# Patient Record
Sex: FEMALE | Race: WHITE
[De-identification: names, ages, dates, MRNs, and addresses within clinical notes are randomized per-mention and may not be internally consistent; named-entity substitution may affect disease eponyms.]

---

## 2018-05-09 ENCOUNTER — HOSPITAL ENCOUNTER (EMERGENCY)
Dept: HOSPITAL 17 - NEPC | Age: 63
Discharge: HOME | End: 2018-05-09
Payer: COMMERCIAL

## 2018-05-09 VITALS
HEART RATE: 92 BPM | OXYGEN SATURATION: 100 % | RESPIRATION RATE: 16 BRPM | DIASTOLIC BLOOD PRESSURE: 69 MMHG | SYSTOLIC BLOOD PRESSURE: 130 MMHG

## 2018-05-09 VITALS — RESPIRATION RATE: 18 BRPM | OXYGEN SATURATION: 100 %

## 2018-05-09 VITALS
OXYGEN SATURATION: 100 % | DIASTOLIC BLOOD PRESSURE: 57 MMHG | TEMPERATURE: 97.4 F | RESPIRATION RATE: 20 BRPM | HEART RATE: 97 BPM | SYSTOLIC BLOOD PRESSURE: 109 MMHG

## 2018-05-09 DIAGNOSIS — I10: ICD-10-CM

## 2018-05-09 DIAGNOSIS — E11.9: ICD-10-CM

## 2018-05-09 DIAGNOSIS — R10.13: Primary | ICD-10-CM

## 2018-05-09 DIAGNOSIS — E78.00: ICD-10-CM

## 2018-05-09 DIAGNOSIS — Z79.84: ICD-10-CM

## 2018-05-09 DIAGNOSIS — E03.9: ICD-10-CM

## 2018-05-09 DIAGNOSIS — M32.9: ICD-10-CM

## 2018-05-09 LAB
ALBUMIN SERPL-MCNC: 2.7 GM/DL (ref 3.4–5)
ALP SERPL-CCNC: 56 U/L (ref 45–117)
ALT SERPL-CCNC: 24 U/L (ref 10–53)
AST SERPL-CCNC: 34 U/L (ref 15–37)
BASOPHILS # BLD AUTO: 0 TH/MM3 (ref 0–0.2)
BASOPHILS NFR BLD: 0.2 % (ref 0–2)
BILIRUB SERPL-MCNC: 0.2 MG/DL (ref 0.2–1)
BUN SERPL-MCNC: 13 MG/DL (ref 7–18)
CALCIUM SERPL-MCNC: 8.5 MG/DL (ref 8.5–10.1)
CHLORIDE SERPL-SCNC: 102 MEQ/L (ref 98–107)
COLOR UR: YELLOW
CREAT SERPL-MCNC: 0.49 MG/DL (ref 0.5–1)
EOSINOPHIL # BLD: 0 TH/MM3 (ref 0–0.4)
EOSINOPHIL NFR BLD: 0.5 % (ref 0–4)
ERYTHROCYTE [DISTWIDTH] IN BLOOD BY AUTOMATED COUNT: 17.4 % (ref 11.6–17.2)
GFR SERPLBLD BASED ON 1.73 SQ M-ARVRAT: 128 ML/MIN (ref 89–?)
GLUCOSE SERPL-MCNC: 114 MG/DL (ref 74–106)
GLUCOSE UR STRIP-MCNC: (no result) MG/DL
HCO3 BLD-SCNC: 26.5 MEQ/L (ref 21–32)
HCT VFR BLD CALC: 28.9 % (ref 35–46)
HGB BLD-MCNC: 9.5 GM/DL (ref 11.6–15.3)
HGB UR QL STRIP: (no result)
INR PPP: 1 RATIO
KETONES UR STRIP-MCNC: (no result) MG/DL
LYMPHOCYTES # BLD AUTO: 0.3 TH/MM3 (ref 1–4.8)
LYMPHOCYTES NFR BLD AUTO: 8.3 % (ref 9–44)
MCH RBC QN AUTO: 27.6 PG (ref 27–34)
MCHC RBC AUTO-ENTMCNC: 33 % (ref 32–36)
MCV RBC AUTO: 83.6 FL (ref 80–100)
MONOCYTE #: 0.2 TH/MM3 (ref 0–0.9)
MONOCYTES NFR BLD: 5.7 % (ref 0–8)
MUCOUS THREADS #/AREA URNS LPF: (no result) /LPF
NEUTROPHILS # BLD AUTO: 2.6 TH/MM3 (ref 1.8–7.7)
NEUTROPHILS NFR BLD AUTO: 85.3 % (ref 16–70)
NITRITE UR QL STRIP: (no result)
PLATELET # BLD: 187 TH/MM3 (ref 150–450)
PMV BLD AUTO: 8.9 FL (ref 7–11)
PROT SERPL-MCNC: 8.4 GM/DL (ref 6.4–8.2)
PROTHROMBIN TIME: 10 SEC (ref 9.8–11.6)
RBC # BLD AUTO: 3.46 MIL/MM3 (ref 4–5.3)
SODIUM SERPL-SCNC: 135 MEQ/L (ref 136–145)
SP GR UR STRIP: 1.02 (ref 1–1.03)
SQUAMOUS #/AREA URNS HPF: 1 /HPF (ref 0–5)
URINE LEUKOCYTE ESTERASE: (no result)
WBC # BLD AUTO: 3.1 TH/MM3 (ref 4–11)

## 2018-05-09 PROCEDURE — 85730 THROMBOPLASTIN TIME PARTIAL: CPT

## 2018-05-09 PROCEDURE — 85610 PROTHROMBIN TIME: CPT

## 2018-05-09 PROCEDURE — 81001 URINALYSIS AUTO W/SCOPE: CPT

## 2018-05-09 PROCEDURE — 74176 CT ABD & PELVIS W/O CONTRAST: CPT

## 2018-05-09 PROCEDURE — 83690 ASSAY OF LIPASE: CPT

## 2018-05-09 PROCEDURE — 93005 ELECTROCARDIOGRAM TRACING: CPT

## 2018-05-09 PROCEDURE — 85025 COMPLETE CBC W/AUTO DIFF WBC: CPT

## 2018-05-09 PROCEDURE — 80053 COMPREHEN METABOLIC PANEL: CPT

## 2018-05-09 NOTE — PD
HPI


Chief Complaint:  Abdominal Pain


Time Seen by Provider:  10:15


Travel History


International Travel<30 days:  No


Contact w/Intl Traveler<30days:  No


Traveled to known affect area:  No





History of Present Illness


HPI


She comes in complaining of an epigastric type pain that is nonradiating, is 

rated at about a 5 out of 10, burning to sharp, improves with eating, 

associated with nausea but not necessarily with vomiting.  Patient denies any 

aggravating factors.  Patient denies any associated factors such as fever, back 

pain, flank pain, chest pain, or diarrhea.  The patient has a rheumatologist, 

gastroenterologist with a endoscopy scheduled for mid July, also has a primary 

care physician [place] out of the land.  Patient is essentially here for 

breakthrough pain control











States allergy to codeine


Past medical history significant for hypothyroidism, hypercholesterolemia, 

hypertension, appendectomy, hysterectomy, diabetes, lupus, anemia, and 

rheumatoid arthritis





PFSH


Past Medical History


Anemia:  Yes


Arthritis:  Yes (RA)


Autoimmune Disease:  Yes (lupus)


High Cholesterol:  Yes


Diabetes:  Yes


Patient Takes Glucophage:  Yes


Hypertension:  Yes


Thyroid Disease:  Yes





Past Surgical History


Abdominal Surgery:  Yes (bladder sling)


Appendectomy:  Yes


Gynecologic Surgery:  Yes


Hysterectomy:  Yes





Social History


Alcohol Use:  No


Tobacco Use:  No


Substance Use:  No





Allergies-Medications


(Allergen,Severity, Reaction):  


Coded Allergies:  


     codeine (Verified  Allergy, Intermediate, 5/9/18)


Reported Meds & Prescriptions





Reported Meds & Active Scripts


Active


Nystatin-Triamcinolone 100,000-0.1 Unit/Gm Oint 1 Applic TOPICAL Q12HR


Tramadol (Tramadol HCl) 50 Mg Tab 50 Mg PO Q8H PRN


Reported


Levothyroxine (Levothyroxine Sodium) 50 Mcg Tab 50 Mcg PO DAILY


Metformin (Metformin HCl) 500 Mg Tab 500 Mg PO BIDPC


Aspirin Low Dose (Aspirin) 81 Mg Chew 81 Mg CHEW DAILY


Lovastatin 20 Mg Tab 20 Mg PO DAILY


Metoprolol Tartrate 25 Mg Tab 25 Mg PO BID


Losartan (Losartan Potassium) 50 Mg Tab 50 Mg PO DAILY








Review of Systems


General / Constitutional:  No: Fever


Eyes:  No: Visual changes


HENT:  No: Headaches


Cardiovascular:  No: Chest Pain or Discomfort


Respiratory:  No: Shortness of Breath


Gastrointestinal:  Positive: Abdominal Pain


Genitourinary:  No: Dysuria


Musculoskeletal:  No: Pain


Skin:  No Rash


Neurologic:  No: Weakness


Psychiatric:  No: Depression


Endocrine:  No: Polydipsia


Hematologic/Lymphatic:  No: Easy Bruising





Physical Exam


Narrative


GENERAL: 


SKIN: Warm and dry.


HEAD: Atraumatic. Normocephalic. 


EYES: Pupils equal and round. No scleral icterus. No injection or drainage. 


ENT: No nasal bleeding or discharge.  Mucous membranes pink and moist.


NECK: Trachea midline. No JVD. 


CARDIOVASCULAR: Regular rate and rhythm.  


RESPIRATORY: No accessory muscle use. Clear to auscultation. Breath sounds 

equal bilaterally. 


GASTROINTESTINAL: Abdomen soft, non-tender, nondistended. 


MUSCULOSKELETAL: Extremities without clubbing, cyanosis, or edema. No obvious 

deformities. 


NEUROLOGICAL: Awake and alert. No obvious cranial nerve deficits.  Motor 

grossly within normal limits. Five out of 5 muscle strength in the arms and 

legs.  Normal speech.


PSYCHIATRIC: Appropriate mood and affect; insight and judgment normal.





Data


Data


Last Documented VS





Vital Signs








  Date Time  Temp Pulse Resp B/P (MAP) Pulse Ox O2 Delivery O2 Flow Rate FiO2


 


5/9/18 10:26   18  100 Room Air  


 


5/9/18 10:17  92      


 


5/9/18 10:03 97.4       








Orders





 Orders


Complete Blood Count With Diff (5/9/18 10:21)


Comprehensive Metabolic Panel (5/9/18 10:21)


Lipase (5/9/18 10:21)


Prothrombin Time / Inr (Pt) (5/9/18 10:21)


Act Partial Throm Time (Ptt) (5/9/18 10:21)


Urinalysis - C+S If Indicated (5/9/18 10:21)


Ct Abd/Pel W/O Iv Contrast (5/9/18 10:21)


Iv Access Insert/Monitor (5/9/18 10:21)


Ecg Monitoring (5/9/18 10:21)


Oximetry (5/9/18 10:21)


NPO (5/9/18 10:21)


Sodium Chloride 0.9% Flush (Ns Flush) (5/9/18 10:30)


Electrocardiogram (5/9/18 10:21)





Labs





Laboratory Tests








Test


  5/9/18


10:45


 


White Blood Count 3.1 TH/MM3 


 


Red Blood Count 3.46 MIL/MM3 


 


Hemoglobin 9.5 GM/DL 


 


Hematocrit 28.9 % 


 


Mean Corpuscular Volume 83.6 FL 


 


Mean Corpuscular Hemoglobin 27.6 PG 


 


Mean Corpuscular Hemoglobin


Concent 33.0 % 


 


 


Red Cell Distribution Width 17.4 % 


 


Platelet Count 187 TH/MM3 


 


Mean Platelet Volume 8.9 FL 


 


Neutrophils (%) (Auto) 85.3 % 


 


Lymphocytes (%) (Auto) 8.3 % 


 


Monocytes (%) (Auto) 5.7 % 


 


Eosinophils (%) (Auto) 0.5 % 


 


Basophils (%) (Auto) 0.2 % 


 


Neutrophils # (Auto) 2.6 TH/MM3 


 


Lymphocytes # (Auto) 0.3 TH/MM3 


 


Monocytes # (Auto) 0.2 TH/MM3 


 


Eosinophils # (Auto) 0.0 TH/MM3 


 


Basophils # (Auto) 0.0 TH/MM3 


 


CBC Comment DIFF FINAL 


 


Differential Comment  


 


Prothrombin Time 10.0 SEC 


 


Prothromb Time International


Ratio 1.0 RATIO 


 


 


Activated Partial


Thromboplast Time 25.0 SEC 


 


 


Urine Color YELLOW 


 


Urine Turbidity CLEAR 


 


Urine pH 6.5 


 


Urine Specific Gravity 1.016 


 


Urine Protein 30 mg/dL 


 


Urine Glucose (UA) NEG mg/dL 


 


Urine Ketones NEG mg/dL 


 


Urine Occult Blood TRACE 


 


Urine Nitrite NEG 


 


Urine Bilirubin NEG 


 


Urine Urobilinogen


  LESS THAN 2.0


MG/DL


 


Urine Leukocyte Esterase NEG 


 


Urine RBC 1 /hpf 


 


Urine WBC 1 /hpf 


 


Urine Squamous Epithelial


Cells 1 /hpf 


 


 


Urine Mucus FEW /lpf 


 


Microscopic Urinalysis Comment


  CULT NOT


INDICATED


 


Blood Urea Nitrogen 13 MG/DL 


 


Creatinine 0.49 MG/DL 


 


Random Glucose 114 MG/DL 


 


Total Protein 8.4 GM/DL 


 


Albumin 2.7 GM/DL 


 


Calcium Level 8.5 MG/DL 


 


Alkaline Phosphatase 56 U/L 


 


Aspartate Amino Transf


(AST/SGOT) 34 U/L 


 


 


Alanine Aminotransferase


(ALT/SGPT) 24 U/L 


 


 


Total Bilirubin 0.2 MG/DL 


 


Sodium Level 135 MEQ/L 


 


Potassium Level 4.1 MEQ/L 


 


Chloride Level 102 MEQ/L 


 


Carbon Dioxide Level 26.5 MEQ/L 


 


Anion Gap 7 MEQ/L 


 


Estimat Glomerular Filtration


Rate 128 ML/MIN 


 


 


Lipase 82 U/L 











[facility]


Medical Decision Making


Medical Screen Exam Complete:  Yes


Emergency Medical Condition:  Yes


Medical Record Reviewed:  Yes


Differential Diagnosis


Pancreatitis versus hepatitis versus peptic ulcer disease versus perforated 

ulcer versus small bowel obstruction


Narrative Course


CBC shows WBC count of 3.1, mild anemia of 9.5/28.9, of particular note the 

patient has previous older labs in which she has a hemoglobin of 10/30.  So 

this is not a new finding this is a chronic finding.


Coagulation profile is within normal limits


UA does not show any evidence of a UTI


Electrolytes are all within normal limits with the exception of a random 

glucose of 114, normal kidney, liver and lipase function


CT abdomen and pelvis shows exophytic left renal mass likely hemorrhagic cyst, 

elective outpatient follow-up ultrasound is suggested.  This has been explained 

to the patient and a copy of this report will be made available to the patient 

for her to take to her primary care.





Diagnosis





 Primary Impression:  


 Dyspepsia


Patient Instructions:  Diet for Stomach Ulcers and Gastritis (ED), General 

Instructions, Peptic Ulcer (GEN)


Scripts


Nystatin-Triamcinolone (Nystatin-Triamcinolone) 100,000-0.1 Unit/Gm Oint


1 APPLIC TOPICAL Q12HR for Infection, #60 GM 2 Refills


   Prov: Tone Meneses MD         5/9/18 


Tramadol (Tramadol) 50 Mg Tab


50 MG PO Q8H Y for PAIN, #15 TAB 0 Refills


   Prov: Tone Meneses MD         5/9/18


Disposition:  01 DISCHARGE HOME


Condition:  Stable











Tone Meneses MD May 9, 2018 10:20

## 2018-05-09 NOTE — RADRPT
EXAM DATE/TIME:  05/09/2018 11:04 

 

[facility] COMPARISON:     

No previous studies available for comparison.

 

 

INDICATIONS :     

Abdominal pain. 

                  

 

ORAL CONTRAST:      

No oral contrast ingested.

                  

 

RADIATION DOSE:     

4.51 CTDIvol (mGy) 

 

 

MEDICAL HISTORY :     

Hypertension. Lupus. Rheumatoid arthritis.

 

SURGICAL HISTORY :      

Appendectomy. 

 

ENCOUNTER:      

Initial

 

ACUITY:      

1 week

 

PAIN SCALE:      

5/10

 

LOCATION:       

Bilateral anterior 

 

TECHNIQUE:     

Volumetric scanning of the abdomen and pelvis was performed.  Using automated exposure control and ad
justment of the mA and/or kV according to patient size, radiation dose was kept as low as reasonably 
achievable to obtain optimal diagnostic quality images.  DICOM format image data is available electro
nically for review and comparison.  

 

FINDINGS:     

 

LOWER LUNGS:     

The visualized lower lungs are clear.

 

LIVER:     

Homogeneous density without lesion.  There is no dilation of the biliary tree.  No calcified gallston
es.

 

SPLEEN:     

Normal size without lesion.

 

PANCREAS:     

Within normal limits. 

 

KIDNEYS:     

16 mm spontaneously dense mass arising exophytically from the posterior midpole cortex of the left ki
dney is likely a hemorrhagic cyst. Follow up would be recommended. The right kidney is unremarkable. 
There is no evidence of hydronephrosis or stone.

 

ADRENAL GLANDS:     

Within normal limits.

 

VASCULAR:     

There is no aortic aneurysm.

 

BOWEL/MESENTERY:     

The stomach, small bowel, and colon demonstrate no acute abnormality.  There is no free intraperitone
al air or fluid. 

 

ABDOMINAL WALL:     

Within normal limits.

 

RETROPERITONEUM:     

There is no lymphadenopathy.

 

BLADDER:     

No wall thickening or mass.

 

REPRODUCTIVE:     

Within normal limits.

 

INGUINAL:     

There is no lymphadenopathy or hernia.

 

MUSCULOSKELETAL:     

Within normal limits for patient age.

 

CONCLUSION:     

Exophytic left renal mass is likely hemorrhagic cyst. Elective outpatient followup ultrasound would b
e suggested.

No acute CT findings in the abdomen or pelvis.

 

 

 

 Edu Avila MD on May 09, 2018 at 11:16           

Board Certified Radiologist.

 This report was verified electronically.

## 2018-05-10 NOTE — EKG
Date Performed: 05/09/2018       Time Performed: 10:51:44

 

PTAGE:      62 years

 

EKG:      Sinus rhythm 

 

 LOW QRS VOLTAGE IN EXTREMITY LEADS BORDERLINE ECG INTERPRETATION BASED ON A DEFAULT AGE OF 39 YEARS 


 

NO PREVIOUS TRACING            

 

DOCTOR:   Yuriy Barr  Interpretating Date/Time  05/10/2018 19:12:26

## 2018-05-16 ENCOUNTER — HOSPITAL ENCOUNTER (OUTPATIENT)
Dept: HOSPITAL 17 - NEPE | Age: 63
Setting detail: OBSERVATION
LOS: 2 days | Discharge: HOME | End: 2018-05-18
Attending: HOSPITALIST | Admitting: HOSPITALIST
Payer: SELF-PAY

## 2018-05-16 VITALS
OXYGEN SATURATION: 98 % | SYSTOLIC BLOOD PRESSURE: 110 MMHG | TEMPERATURE: 98.9 F | DIASTOLIC BLOOD PRESSURE: 60 MMHG | HEART RATE: 88 BPM | RESPIRATION RATE: 20 BRPM

## 2018-05-16 VITALS
HEART RATE: 86 BPM | OXYGEN SATURATION: 100 % | RESPIRATION RATE: 18 BRPM | TEMPERATURE: 98.4 F | SYSTOLIC BLOOD PRESSURE: 108 MMHG | DIASTOLIC BLOOD PRESSURE: 59 MMHG

## 2018-05-16 VITALS
TEMPERATURE: 99.4 F | HEART RATE: 106 BPM | DIASTOLIC BLOOD PRESSURE: 54 MMHG | SYSTOLIC BLOOD PRESSURE: 96 MMHG | OXYGEN SATURATION: 97 % | RESPIRATION RATE: 18 BRPM

## 2018-05-16 VITALS
RESPIRATION RATE: 17 BRPM | HEART RATE: 98 BPM | DIASTOLIC BLOOD PRESSURE: 59 MMHG | OXYGEN SATURATION: 100 % | SYSTOLIC BLOOD PRESSURE: 108 MMHG

## 2018-05-16 VITALS
TEMPERATURE: 98.7 F | HEART RATE: 97 BPM | OXYGEN SATURATION: 99 % | DIASTOLIC BLOOD PRESSURE: 62 MMHG | SYSTOLIC BLOOD PRESSURE: 109 MMHG | RESPIRATION RATE: 18 BRPM

## 2018-05-16 VITALS
SYSTOLIC BLOOD PRESSURE: 103 MMHG | TEMPERATURE: 100.1 F | HEART RATE: 100 BPM | DIASTOLIC BLOOD PRESSURE: 57 MMHG | RESPIRATION RATE: 18 BRPM | OXYGEN SATURATION: 99 %

## 2018-05-16 VITALS
DIASTOLIC BLOOD PRESSURE: 82 MMHG | HEART RATE: 107 BPM | SYSTOLIC BLOOD PRESSURE: 88 MMHG | OXYGEN SATURATION: 100 % | RESPIRATION RATE: 17 BRPM

## 2018-05-16 VITALS
HEART RATE: 99 BPM | OXYGEN SATURATION: 100 % | RESPIRATION RATE: 20 BRPM | DIASTOLIC BLOOD PRESSURE: 59 MMHG | SYSTOLIC BLOOD PRESSURE: 108 MMHG

## 2018-05-16 VITALS
DIASTOLIC BLOOD PRESSURE: 63 MMHG | TEMPERATURE: 98.3 F | RESPIRATION RATE: 18 BRPM | OXYGEN SATURATION: 100 % | HEART RATE: 100 BPM | SYSTOLIC BLOOD PRESSURE: 106 MMHG

## 2018-05-16 VITALS
DIASTOLIC BLOOD PRESSURE: 63 MMHG | HEART RATE: 90 BPM | OXYGEN SATURATION: 100 % | RESPIRATION RATE: 18 BRPM | TEMPERATURE: 98.1 F | SYSTOLIC BLOOD PRESSURE: 110 MMHG

## 2018-05-16 VITALS — WEIGHT: 90.39 LBS | HEIGHT: 57 IN | BODY MASS INDEX: 19.5 KG/M2

## 2018-05-16 VITALS
TEMPERATURE: 98.4 F | RESPIRATION RATE: 18 BRPM | SYSTOLIC BLOOD PRESSURE: 106 MMHG | HEART RATE: 96 BPM | DIASTOLIC BLOOD PRESSURE: 61 MMHG | OXYGEN SATURATION: 100 %

## 2018-05-16 VITALS
SYSTOLIC BLOOD PRESSURE: 112 MMHG | RESPIRATION RATE: 18 BRPM | OXYGEN SATURATION: 98 % | HEART RATE: 82 BPM | TEMPERATURE: 98.7 F | DIASTOLIC BLOOD PRESSURE: 58 MMHG

## 2018-05-16 DIAGNOSIS — E03.9: ICD-10-CM

## 2018-05-16 DIAGNOSIS — M06.9: ICD-10-CM

## 2018-05-16 DIAGNOSIS — R63.4: ICD-10-CM

## 2018-05-16 DIAGNOSIS — R06.02: ICD-10-CM

## 2018-05-16 DIAGNOSIS — E11.9: ICD-10-CM

## 2018-05-16 DIAGNOSIS — I10: ICD-10-CM

## 2018-05-16 DIAGNOSIS — M32.9: ICD-10-CM

## 2018-05-16 DIAGNOSIS — E78.5: ICD-10-CM

## 2018-05-16 DIAGNOSIS — R10.13: ICD-10-CM

## 2018-05-16 DIAGNOSIS — M25.50: ICD-10-CM

## 2018-05-16 DIAGNOSIS — D64.9: Primary | ICD-10-CM

## 2018-05-16 DIAGNOSIS — R11.0: ICD-10-CM

## 2018-05-16 LAB
ALBUMIN SERPL-MCNC: 2.3 GM/DL (ref 3.4–5)
ALP SERPL-CCNC: 64 U/L (ref 45–117)
ALT SERPL-CCNC: 35 U/L (ref 10–53)
AST SERPL-CCNC: 94 U/L (ref 15–37)
BACTERIA #/AREA URNS HPF: (no result) /HPF
BASOPHILS # BLD AUTO: 0 TH/MM3 (ref 0–0.2)
BASOPHILS NFR BLD: 0.3 % (ref 0–2)
BILIRUB SERPL-MCNC: 0.6 MG/DL (ref 0.2–1)
BUN SERPL-MCNC: 9 MG/DL (ref 7–18)
CALCIUM SERPL-MCNC: 8.1 MG/DL (ref 8.5–10.1)
CHLORIDE SERPL-SCNC: 99 MEQ/L (ref 98–107)
COLOR UR: YELLOW
CREAT SERPL-MCNC: 0.52 MG/DL (ref 0.5–1)
EOSINOPHIL # BLD: 0 TH/MM3 (ref 0–0.4)
EOSINOPHIL NFR BLD: 0 % (ref 0–4)
ERYTHROCYTE [DISTWIDTH] IN BLOOD BY AUTOMATED COUNT: 16.9 % (ref 11.6–17.2)
GFR SERPLBLD BASED ON 1.73 SQ M-ARVRAT: 119 ML/MIN (ref 89–?)
GLUCOSE SERPL-MCNC: 149 MG/DL (ref 74–106)
GLUCOSE UR STRIP-MCNC: (no result) MG/DL
HCO3 BLD-SCNC: 21.7 MEQ/L (ref 21–32)
HCT VFR BLD CALC: 25.4 % (ref 35–46)
HGB BLD-MCNC: 8.4 GM/DL (ref 11.6–15.3)
HGB UR QL STRIP: (no result)
KETONES UR STRIP-MCNC: (no result) MG/DL
LYMPHOCYTES # BLD AUTO: 0.2 TH/MM3 (ref 1–4.8)
LYMPHOCYTES NFR BLD AUTO: 4.4 % (ref 9–44)
MCH RBC QN AUTO: 27.6 PG (ref 27–34)
MCHC RBC AUTO-ENTMCNC: 33.1 % (ref 32–36)
MCV RBC AUTO: 83.5 FL (ref 80–100)
MONOCYTE #: 0.1 TH/MM3 (ref 0–0.9)
MONOCYTES NFR BLD: 3.7 % (ref 0–8)
MUCOUS THREADS #/AREA URNS LPF: (no result) /LPF
NEUTROPHILS # BLD AUTO: 3.1 TH/MM3 (ref 1.8–7.7)
NEUTROPHILS NFR BLD AUTO: 91.6 % (ref 16–70)
NITRITE UR QL STRIP: (no result)
PLATELET # BLD: 152 TH/MM3 (ref 150–450)
PMV BLD AUTO: 8.6 FL (ref 7–11)
PROT SERPL-MCNC: 9.3 GM/DL (ref 6.4–8.2)
RBC # BLD AUTO: 3.04 MIL/MM3 (ref 4–5.3)
SODIUM SERPL-SCNC: 130 MEQ/L (ref 136–145)
SP GR UR STRIP: 1.02 (ref 1–1.03)
TRANS CELLS #/AREA URNS HPF: <1 /HPF
URINE LEUKOCYTE ESTERASE: (no result)
WBC # BLD AUTO: 3.4 TH/MM3 (ref 4–11)

## 2018-05-16 PROCEDURE — 96374 THER/PROPH/DIAG INJ IV PUSH: CPT

## 2018-05-16 PROCEDURE — 86900 BLOOD TYPING SEROLOGIC ABO: CPT

## 2018-05-16 PROCEDURE — 99285 EMERGENCY DEPT VISIT HI MDM: CPT

## 2018-05-16 PROCEDURE — G0378 HOSPITAL OBSERVATION PER HR: HCPCS

## 2018-05-16 PROCEDURE — 86140 C-REACTIVE PROTEIN: CPT

## 2018-05-16 PROCEDURE — 86901 BLOOD TYPING SEROLOGIC RH(D): CPT

## 2018-05-16 PROCEDURE — 83550 IRON BINDING TEST: CPT

## 2018-05-16 PROCEDURE — 36430 TRANSFUSION BLD/BLD COMPNT: CPT

## 2018-05-16 PROCEDURE — 93005 ELECTROCARDIOGRAM TRACING: CPT

## 2018-05-16 PROCEDURE — 85007 BL SMEAR W/DIFF WBC COUNT: CPT

## 2018-05-16 PROCEDURE — 85027 COMPLETE CBC AUTOMATED: CPT

## 2018-05-16 PROCEDURE — 96372 THER/PROPH/DIAG INJ SC/IM: CPT

## 2018-05-16 PROCEDURE — 96376 TX/PRO/DX INJ SAME DRUG ADON: CPT

## 2018-05-16 PROCEDURE — 80061 LIPID PANEL: CPT

## 2018-05-16 PROCEDURE — P9016 RBC LEUKOCYTES REDUCED: HCPCS

## 2018-05-16 PROCEDURE — 80053 COMPREHEN METABOLIC PANEL: CPT

## 2018-05-16 PROCEDURE — 86920 COMPATIBILITY TEST SPIN: CPT

## 2018-05-16 PROCEDURE — 83540 ASSAY OF IRON: CPT

## 2018-05-16 PROCEDURE — 86850 RBC ANTIBODY SCREEN: CPT

## 2018-05-16 PROCEDURE — 96361 HYDRATE IV INFUSION ADD-ON: CPT

## 2018-05-16 PROCEDURE — 93308 TTE F-UP OR LMTD: CPT

## 2018-05-16 PROCEDURE — 85652 RBC SED RATE AUTOMATED: CPT

## 2018-05-16 PROCEDURE — 81001 URINALYSIS AUTO W/SCOPE: CPT

## 2018-05-16 PROCEDURE — 97162 PT EVAL MOD COMPLEX 30 MIN: CPT

## 2018-05-16 PROCEDURE — 82948 REAGENT STRIP/BLOOD GLUCOSE: CPT

## 2018-05-16 PROCEDURE — 82607 VITAMIN B-12: CPT

## 2018-05-16 PROCEDURE — 85025 COMPLETE CBC W/AUTO DIFF WBC: CPT

## 2018-05-16 RX ADMIN — NYSTATIN AND TRIAMCINOLONE ACETONIDE SCH APPLIC: 100000; 1 OINTMENT TOPICAL at 20:27

## 2018-05-16 RX ADMIN — INSULIN ASPART SCH: 100 INJECTION, SOLUTION INTRAVENOUS; SUBCUTANEOUS at 12:00

## 2018-05-16 RX ADMIN — METHYLPREDNISOLONE SODIUM SUCCINATE SCH MG: 40 INJECTION, POWDER, FOR SOLUTION INTRAMUSCULAR; INTRAVENOUS at 23:21

## 2018-05-16 RX ADMIN — Medication SCH ML: at 20:24

## 2018-05-16 RX ADMIN — PHENYTOIN SODIUM SCH MLS/HR: 50 INJECTION INTRAMUSCULAR; INTRAVENOUS at 20:24

## 2018-05-16 RX ADMIN — METOPROLOL TARTRATE SCH MG: 25 TABLET, FILM COATED ORAL at 20:24

## 2018-05-16 RX ADMIN — PHENYTOIN SODIUM SCH MLS/HR: 50 INJECTION INTRAMUSCULAR; INTRAVENOUS at 12:08

## 2018-05-16 RX ADMIN — INSULIN ASPART SCH: 100 INJECTION, SOLUTION INTRAVENOUS; SUBCUTANEOUS at 20:24

## 2018-05-16 RX ADMIN — INSULIN ASPART SCH: 100 INJECTION, SOLUTION INTRAVENOUS; SUBCUTANEOUS at 17:00

## 2018-05-16 NOTE — EKG
Date Performed: 05/16/2018       Time Performed: 08:04:04

 

PTAGE:      62 years

 

EKG:      SINUS TACHYCARDIA LOW QRS VOLTAGE ABNORMAL ECG

 

PREVIOUS TRACING       : 05/09/2018 10.51 Since the previous tracing, no significant change noted

 

DOCTOR:   Chevy Wright  Interpretating Date/Time  05/16/2018 22:13:38

## 2018-05-16 NOTE — HHI.HP
__________________________________________________





[facility]


Service


[facility]


Primary Care Physician


Non-Staff


Admission Diagnosis





Symptomatic anemia, lupus


Diagnoses:  


Travel History


International Travel<30 Days:  No


Contact w/Intl Traveler <30 Da:  No


Traveled to Known Affected Are:  No


History of Present Illness


Mrs. Bustamante is a 62 year old female.  She has a history of lupus.  Other medical 

conditions include diabetes mellitus type 2 and hypothyroidism.  She says that 

recently she has been feeling weak, increased joint pains, chronic nausea, 

decreased appetite, fatigue, rash, and weight loss.  Previously she had been on 

Plaquenil and steroids to help control her lupus.  She had discontinued these 

treatments about 1.5 to 2 years ago.  For about a year she had felt well but in 

the past 6 months to 12 months she has had a gradual decline.  Recently she has 

been having shortness of breath which became concerning to her family and she 

was encouraged to come visit the ER.  She was found to be anemic and shows 

multiple evidence use of lupus exacerbation.  Etiology for her anemia may be 

related to poor p.o. intake combined with inflammation and chronic disease.  

Further studies are pending.  No other complaints at this time.





Review of Systems


Constitutional:  COMPLAINS OF: Fatigue, DENIES: Fever, Night Sweats


Eyes:  DENIES: Diplopia, Eye inflammation, Eye pain


Ears, nose, mouth, throat:  DENIES: Hearing loss, Vertigo, Nasal discharge


Respiratory:  DENIES: Apneas, Cough


Cardiovascular:  COMPLAINS OF: Dyspnea on Exertion, DENIES: Chest pain, 

Palpitations, Syncope


Gastrointestinal:  COMPLAINS OF: Abdominal pain, Nausea, DENIES: Black stools, 

Bloody stools


Musculoskeletal:  COMPLAINS OF: Joint pain, Muscle aches, Joint Swelling, Back 

pain


Integumentary:  COMPLAINS OF: Rash


Hematologic/lymphatic:  DENIES: Bruising, Lymphadenopathy


Immunologic/allergic:  DENIES: Eczema, Urticaria


Neurologic:  COMPLAINS OF: Abnormal gait, Headache, DENIES: Speech Problems


Psychiatric:  DENIES: Anxiety, Confusion, Depression





Past Family Social History


Past Medical History


Hypothyroidism


Hyperlipidemia


Hypertension


Lupus/RA


Diabetes type II


Chronic anemia


Past Surgical History


Hysterectomy


Bladder sling


Appendectomy


Reported Medications





Reported Meds & Active Scripts


Active


Nystatin-Triamcinolone 100,000-0.1 Unit/Gm Oint 1 Applic TOPICAL Q12HR


Tramadol (Tramadol HCl) 50 Mg Tab 50 Mg PO Q8H PRN


Reported


Levothyroxine (Levothyroxine Sodium) 50 Mcg Tab 50 Mcg PO DAILY


Metformin (Metformin HCl) 500 Mg Tab 500 Mg PO BIDPC


Aspirin Low Dose (Aspirin) 81 Mg Chew 81 Mg CHEW DAILY


Lovastatin 20 Mg Tab 20 Mg PO DAILY


Metoprolol Tartrate 25 Mg Tab 25 Mg PO BID


Losartan (Losartan Potassium) 50 Mg Tab 50 Mg PO DAILY


Allergies:  


Coded Allergies:  


     codeine (Verified  Allergy, Intermediate, 18)


Family History


CVA myocardial infarction in father


Coronary artery disease in mother


Social History


No smoking


No alcohol abuse


No illicit drug abuse





Physical Exam


Vital Signs





Vital Signs








  Date Time  Temp Pulse Resp B/P (MAP) Pulse Ox O2 Delivery O2 Flow Rate FiO2


 


18 08:42  107 17 88/82 (84) 100 Room Air  


 


18 08:02  110 16  100 Room Air  








Physical Exam


GENERAL: NAD, A&Ox3


HEAD: Normocephalic. 


NECK: Supple, trachea midline. No lymphadenopathy.


EYES: No scleral icterus. No injection or drainage. 


CARDIOVASCULAR: Regular rate and rhythm without murmurs, gallops, or rubs. 


RESPIRATORY: Breath sounds equal bilaterally. No accessory muscle use.


GASTROINTESTINAL: Abdomen soft, non-tender, nondistended. 


MUSCULOSKELETAL: No cyanosis, or edema.  Pain in joints with range of motion 

and palpation.


SKIN: Warm and dry.  Rash on back, mild.


NEURO:  No focal neurological deficitis.


Laboratory





Laboratory Tests








Test


  18


08:16 18


11:13


 


White Blood Count 3.4  


 


Red Blood Count 3.04  


 


Hemoglobin 8.4  


 


Hematocrit 25.4  


 


Mean Corpuscular Volume 83.5  


 


Mean Corpuscular Hemoglobin 27.6  


 


Mean Corpuscular Hemoglobin


Concent 33.1 


  


 


 


Red Cell Distribution Width 16.9  


 


Platelet Count 152  


 


Mean Platelet Volume 8.6  


 


Neutrophils (%) (Auto) 91.6  


 


Lymphocytes (%) (Auto) 4.4  


 


Monocytes (%) (Auto) 3.7  


 


Eosinophils (%) (Auto) 0.0  


 


Basophils (%) (Auto) 0.3  


 


Neutrophils # (Auto) 3.1  


 


Lymphocytes # (Auto) 0.2  


 


Monocytes # (Auto) 0.1  


 


Eosinophils # (Auto) 0.0  


 


Basophils # (Auto) 0.0  


 


CBC Comment DIFF FINAL  


 


Differential Comment   


 


Blood Urea Nitrogen 9  


 


Creatinine 0.52  


 


Random Glucose 149  


 


Total Protein 9.3  


 


Albumin 2.3  


 


Calcium Level 8.1  


 


Alkaline Phosphatase 64  


 


Aspartate Amino Transf


(AST/SGOT) 94 


  


 


 


Alanine Aminotransferase


(ALT/SGPT) 35 


  


 


 


Total Bilirubin 0.6  


 


Sodium Level 130  


 


Potassium Level 4.0  


 


Chloride Level 99  


 


Carbon Dioxide Level 21.7  


 


Anion Gap 9  


 


Estimat Glomerular Filtration


Rate 119 


  


 


 


Urine Color  YELLOW 


 


Urine Turbidity  CLEAR 


 


Urine pH  6.5 


 


Urine Specific Gravity  1.016 


 


Urine Protein  300 


 


Urine Glucose (UA)  NEG 


 


Urine Ketones  TRACE 


 


Urine Occult Blood  MOD 


 


Urine Nitrite  NEG 


 


Urine Bilirubin  NEG 


 


Urine Urobilinogen  LESS THAN 2.0 


 


Urine Leukocyte Esterase  TRACE 


 


Urine RBC  13 


 


Urine WBC  4 


 


Urine Transitional Epithelial


Cells 


  <1 


 


 


Urine Bacteria  RARE 


 


Urine Mucus  FEW 


 


Microscopic Urinalysis Comment


  


  CULT NOT


INDICATED








Result Diagram:  


1816








Caprini VTE Risk Assessment


Caprini VTE Risk Assessment:  No/Low Risk (score <= 1)


Caprini Risk Assessment Model











 Point Value = 1          Point Value = 2  Point Value = 3  Point Value = 5


 


Age 41-60


Minor surgery


BMI > 25 kg/m2


Swollen legs


Varicose veins


Pregnancy or postpartum


History of unexplained or recurrent


   spontaneous 


Oral contraceptives or hormone


   replacement


Sepsis (< 1 month)


Serious lung disease, including


   pneumonia (< 1 month)


Abnormal pulmonary function


Acute myocardial infarction


Congestive heart failure (< 1 month)


History of inflammatory bowel disease


Medical patient at bed rest Age 61-74


Arthroscopic surgery


Major open surgery (> 45 min)


Laparoscopic surgery (> 45 min)


Malignancy


Confined to bed (> 72 hours)


Immobilizing plaster cast


Central venous access Age >= 75


History of VTE


Family history of VTE


Factor V Leiden


Prothrombin 32478U


Lupus anticoagulant


Anticardiolipin antibodies


Elevated serum homocysteine


Heparin-induced thrombocytopenia


Other congenital or acquired


   thrombophilia Stroke (< 1 month)


Elective arthroplasty


Hip, pelvis, or leg fracture


Acute spinal cord injury (< 1 month)








Prophylaxis Regimen











   Total Risk


Factor Score Risk Level Prophylaxis Regimen


 


0-1      Low Early ambulation


 


2 Moderate Order ONE of the following:


*Sequential Compression Device (SCD)


*Heparin 5000 units SQ BID


 


3-4 Higher Order ONE of the following medications:


*Heparin 5000 units SQ TID


*Enoxaparin/Lovenox 40 mg SQ daily (WT < 150 kg, CrCl > 30 mL/min)


*Enoxaparin/Lovenox 30 mg SQ daily (WT < 150 kg, CrCl > 10-29 mL/min)


*Enoxaparin/Lovenox 30 mg SQ BID (WT < 150 kg, CrCl > 30 mL/min)


AND/OR


*Sequential Compression Device (SCD)


 


5 or more Highest Order ONE of the following medications:


*Heparin 5000 units SQ TID (Preferred with Epidurals)


*Enoxaparin/Lovenox 40 mg SQ daily (WT < 150 kg, CrCl > 30 mL/min)


*Enoxaparin/Lovenox 30 mg SQ daily (WT < 150 kg, CrCl > 10-29 mL/min)


*Enoxaparin/Lovenox 30 mg SQ BID (WT < 150 kg, CrCl > 30 mL/min)


AND


*Sequential Compression Device (SCD)











Assessment and Plan


Problem List:  


(1) Acute anemia


ICD Code:  D64.9 - Anemia, unspecified


(2) Exacerbation of systemic lupus


ICD Code:  M32.9 - Systemic lupus erythematosus, unspecified


(3) Lupus


ICD Code:  L93.0 - Discoid lupus erythematosus


Status:  Acute


(4) Symptomatic anemia


ICD Code:  D64.9 - Anemia, unspecified


Status:  Acute


Assessment and Plan


62-year-old female admitted secondary to lupus exacerbation with findings of 

acute anemia





Lupus exacerbation


Rheumatoid arthritis


Weakness


Start systemic steroids


Check echocardiogram for carditis or pericardial effusion, given shortness of 

breath


PT


IV Hydration for now


Follow for improvements





Acute anemia on chronic anemia


Shortness of breath


2 units of packed red blood cells ordered for transfusion


Echocardiogram as above


Check stool for blood


Check iron studies


Check B12





Hypothyroidism


Recent blood check as an outpatient was within normal limits


Continue supplementation


Follow as an outpatient





Hyperlipidemia


Continue present treatment


Follow as an outpatient





Hypertension


Continue baseline treatments


Follow blood pressures





Diabetes mellitus type 2


Follow blood sugars


Insulin sliding scale


Diabetic diet





DVT prophylaxis


SCDs











Jude Charles MD May 16, 2018 12:02

## 2018-05-16 NOTE — PD
HPI


Chief Complaint:  General Weakness


Time Seen by Provider:  08:31


Travel History


International Travel<30 days:  No


Contact w/Intl Traveler<30days:  No


Traveled to known affect area:  No





History of Present Illness


HPI


This 61-year-old woman presents to the emergency department complaining of 

weakness, worsening, especially with exertion, and feeling out of breath, 

ongoing for the past several weeks.  More short of breath for the past day or 

so.  Heart rate was up in the 130s last night.  She was seen in the emergency 

department recently where she had workup that showed mild anemia was diagnosed 

with dyspepsia.  She is taking NSAIDs regularly for couple weeks leading up to 

that.  She stopped those about a week or so ago.  She has extensive medical 

history including lupus and rheumatoid arthritis.  She has been on Plaquenil 

and prednisone in the past for her rheumatologic disease but has not been for 

the past 2 years.  She is worried she may be having a flare of her lupus.  She 

also has had anemia in the past.  Unclear etiology.  She has never had 

transfusion.  She is scheduled to have an endoscopy in a month or so.  She 

recently moved to the area and has several specialist including her primary, 

Dr. Draper, since [place], Dr. Lee who is her hematologist, Dr. Jimbo Ma who is here endocrinologist, Dr. Fredi Meza who is a rheumatologist, 

all on the left side of the [facility] or in [place].  She has some darker colored 

loose stools several days ago.  Nothing recently.





History


Past Medical History


*** Narrative Medical


Hypothyroidism


Hyperlipidemia


Hypertension


Lupus/RA


Diabetes


Anemia





Social History


Alcohol Use:  No


Tobacco Use:  No





Allergies-Medications


(Allergen,Severity, Reaction):  


Coded Allergies:  


     codeine (Verified  Allergy, Intermediate, 5/16/18)


Reported Meds & Prescriptions





Reported Meds & Active Scripts


Active


Nystatin-Triamcinolone 100,000-0.1 Unit/Gm Oint 1 Applic TOPICAL Q12HR


Tramadol (Tramadol HCl) 50 Mg Tab 50 Mg PO Q8H PRN


Reported


Levothyroxine (Levothyroxine Sodium) 50 Mcg Tab 50 Mcg PO DAILY


Metformin (Metformin HCl) 500 Mg Tab 500 Mg PO BIDPC


Aspirin Low Dose (Aspirin) 81 Mg Chew 81 Mg CHEW DAILY


Lovastatin 20 Mg Tab 20 Mg PO DAILY


Metoprolol Tartrate 25 Mg Tab 25 Mg PO BID


Losartan (Losartan Potassium) 50 Mg Tab 50 Mg PO DAILY








Review of Systems


Except as stated in HPI:  all other systems reviewed are Neg





Physical Exam


Narrative


GENERAL: Weak appearing, frail, 62-year-old woman, no acute distress.


SKIN: Focused skin assessment warm/dry.


HEAD: Atraumatic. Normocephalic. 


EYES: Pupils equal and round. No scleral icterus. No injection or drainage. 


ENT: No nasal bleeding or discharge.  Mucous membranes pink and moist.


NECK: Trachea midline. No JVD. 


CARDIOVASCULAR: Regular rate and rhythm.  No murmur appreciated.


RESPIRATORY: No accessory muscle use. Clear to auscultation. Breath sounds 

equal bilaterally. 


GASTROINTESTINAL: Abdomen soft, non-tender, nondistended. Hepatic and splenic 

margins not palpable. 


MUSCULOSKELETAL: No obvious deformities.  No edema.


NEUROLOGICAL: Awake and alert. No obvious cranial nerve deficits.  Motor 

grossly within normal limits.  Generally weak.  Normal speech.


PSYCHIATRIC: Appropriate mood and affect; insight and judgment normal.





Data


Data


Last Documented VS





Vital Signs








  Date Time  Temp Pulse Resp B/P (MAP) Pulse Ox O2 Delivery O2 Flow Rate FiO2


 


5/16/18 08:42  107 17 88/82 (84) 100 Room Air  








Orders





 Orders


Complete Blood Count With Diff (5/16/18 08:00)


Comprehensive Metabolic Panel (5/16/18 08:00)


Urinalysis - C+S If Indicated (5/16/18 08:00)


Electrocardiogram (5/16/18 )


Type And Screen (5/16/18 08:55)


Red Blood Cells (Rbc) (5/16/18 08:55)


Sodium Chlor 0.9% 250 Ml Inj (Ns 250 Ml (5/16/18 09:00)


Blood Product Administration (5/16/18 10:34)


Sodium Chlor 0.9% 250 Ml Inj (Ns 250 Ml (5/16/18 10:45)


Admit Order (Ed Use Only) (5/16/18 )





Labs





Laboratory Tests








Test


  5/16/18


08:16


 


White Blood Count 3.4 TH/MM3 


 


Red Blood Count 3.04 MIL/MM3 


 


Hemoglobin 8.4 GM/DL 


 


Hematocrit 25.4 % 


 


Mean Corpuscular Volume 83.5 FL 


 


Mean Corpuscular Hemoglobin 27.6 PG 


 


Mean Corpuscular Hemoglobin


Concent 33.1 % 


 


 


Red Cell Distribution Width 16.9 % 


 


Platelet Count 152 TH/MM3 


 


Mean Platelet Volume 8.6 FL 


 


Neutrophils (%) (Auto) 91.6 % 


 


Lymphocytes (%) (Auto) 4.4 % 


 


Monocytes (%) (Auto) 3.7 % 


 


Eosinophils (%) (Auto) 0.0 % 


 


Basophils (%) (Auto) 0.3 % 


 


Neutrophils # (Auto) 3.1 TH/MM3 


 


Lymphocytes # (Auto) 0.2 TH/MM3 


 


Monocytes # (Auto) 0.1 TH/MM3 


 


Eosinophils # (Auto) 0.0 TH/MM3 


 


Basophils # (Auto) 0.0 TH/MM3 


 


CBC Comment DIFF FINAL 


 


Differential Comment  


 


Blood Urea Nitrogen 9 MG/DL 


 


Creatinine 0.52 MG/DL 


 


Random Glucose 149 MG/DL 


 


Total Protein 9.3 GM/DL 


 


Albumin 2.3 GM/DL 


 


Calcium Level 8.1 MG/DL 


 


Alkaline Phosphatase 64 U/L 


 


Aspartate Amino Transf


(AST/SGOT) 94 U/L 


 


 


Alanine Aminotransferase


(ALT/SGPT) 35 U/L 


 


 


Total Bilirubin 0.6 MG/DL 


 


Sodium Level 130 MEQ/L 


 


Potassium Level 4.0 MEQ/L 


 


Chloride Level 99 MEQ/L 


 


Carbon Dioxide Level 21.7 MEQ/L 


 


Anion Gap 9 MEQ/L 


 


Estimat Glomerular Filtration


Rate 119 ML/MIN 


 











[facility]


Medical Decision Making


Medical Screen Exam Complete:  Yes


Emergency Medical Condition:  Yes


Interpretation(s)


My review of EKG: Sinus tachycardia rate 101, normal axis, normal intervals, no 

acute ischemia.  Small voltage.





LABS:


CBC is remarkable for mild anemia, hemoglobin 8.4, down from 9.5 one week ago


CMP general unremarkable, total protein is 9.3


Differential Diagnosis


GI bleed, anemia, lupus flare, other


Narrative Course


Medical decision making





Is a 61-year-old woman presents to the emergency department with generalized 

weakness, increasing shortness of breath, rapid heart rate, borderline low 

blood pressures.  Overall well.  Guaiac was negative.  History suspicious for 

NSAID gastritis with worsening anemia and GI bleed.  Rheumatologic disease 

flare also possible.  Will recommend admission, transfusion, reassessment.





HemaPrompt Point of Care


Internal Pos. & Neg. Controls:  Passed


Fecal Specimen Occult Blood:  Negative





Diagnosis





 Primary Impression:  


 Symptomatic anemia


 Additional Impression:  


 Lupus





Admitting Information


Admitting Physician Requests:  Admit











Angus Estrella MD May 16, 2018 10:34

## 2018-05-17 VITALS
SYSTOLIC BLOOD PRESSURE: 124 MMHG | OXYGEN SATURATION: 100 % | HEART RATE: 65 BPM | RESPIRATION RATE: 16 BRPM | TEMPERATURE: 98 F | DIASTOLIC BLOOD PRESSURE: 68 MMHG

## 2018-05-17 VITALS
TEMPERATURE: 97.8 F | RESPIRATION RATE: 18 BRPM | HEART RATE: 66 BPM | OXYGEN SATURATION: 100 % | SYSTOLIC BLOOD PRESSURE: 153 MMHG | DIASTOLIC BLOOD PRESSURE: 84 MMHG

## 2018-05-17 VITALS
HEART RATE: 68 BPM | OXYGEN SATURATION: 100 % | TEMPERATURE: 97.7 F | DIASTOLIC BLOOD PRESSURE: 65 MMHG | SYSTOLIC BLOOD PRESSURE: 137 MMHG | RESPIRATION RATE: 18 BRPM

## 2018-05-17 VITALS
HEART RATE: 59 BPM | SYSTOLIC BLOOD PRESSURE: 123 MMHG | OXYGEN SATURATION: 98 % | TEMPERATURE: 98 F | RESPIRATION RATE: 16 BRPM | DIASTOLIC BLOOD PRESSURE: 57 MMHG

## 2018-05-17 VITALS
RESPIRATION RATE: 16 BRPM | TEMPERATURE: 97.6 F | HEART RATE: 60 BPM | DIASTOLIC BLOOD PRESSURE: 62 MMHG | SYSTOLIC BLOOD PRESSURE: 113 MMHG | OXYGEN SATURATION: 100 %

## 2018-05-17 VITALS
TEMPERATURE: 97.5 F | DIASTOLIC BLOOD PRESSURE: 65 MMHG | SYSTOLIC BLOOD PRESSURE: 117 MMHG | OXYGEN SATURATION: 99 % | HEART RATE: 61 BPM | RESPIRATION RATE: 16 BRPM

## 2018-05-17 VITALS
TEMPERATURE: 97.8 F | OXYGEN SATURATION: 98 % | RESPIRATION RATE: 16 BRPM | SYSTOLIC BLOOD PRESSURE: 132 MMHG | DIASTOLIC BLOOD PRESSURE: 71 MMHG | HEART RATE: 61 BPM

## 2018-05-17 VITALS — HEART RATE: 59 BPM

## 2018-05-17 VITALS — HEART RATE: 69 BPM

## 2018-05-17 VITALS — HEART RATE: 66 BPM

## 2018-05-17 LAB
% SATURATION IRON PROFILE: 32.5 % (ref 20–50)
ALBUMIN SERPL-MCNC: 2.2 GM/DL (ref 3.4–5)
ALP SERPL-CCNC: 67 U/L (ref 45–117)
ALT SERPL-CCNC: 39 U/L (ref 10–53)
AST SERPL-CCNC: 62 U/L (ref 15–37)
BASOPHILS # BLD AUTO: 0 TH/MM3 (ref 0–0.2)
BASOPHILS NFR BLD: 0.1 % (ref 0–2)
BILIRUB SERPL-MCNC: 0.5 MG/DL (ref 0.2–1)
BUN SERPL-MCNC: 20 MG/DL (ref 7–18)
CALCIUM SERPL-MCNC: 8 MG/DL (ref 8.5–10.1)
CHLORIDE SERPL-SCNC: 102 MEQ/L (ref 98–107)
CHOLEST SERPL-MCNC: 156 MG/DL (ref 120–200)
CHOLESTEROL/ HDL RATIO: 4.4 RATIO
CREAT SERPL-MCNC: 0.52 MG/DL (ref 0.5–1)
CRP SERPL-MCNC: 1.3 MG/DL (ref 0–0.3)
DACRYOCYTES BLD QL SMEAR: (no result)
EOSINOPHIL # BLD: 0 TH/MM3 (ref 0–0.4)
EOSINOPHIL NFR BLD: 0 % (ref 0–4)
ERYTHROCYTE [DISTWIDTH] IN BLOOD BY AUTOMATED COUNT: 16.6 % (ref 11.6–17.2)
GFR SERPLBLD BASED ON 1.73 SQ M-ARVRAT: 119 ML/MIN (ref 89–?)
GLUCOSE SERPL-MCNC: 139 MG/DL (ref 74–106)
HCO3 BLD-SCNC: 21.7 MEQ/L (ref 21–32)
HCT VFR BLD CALC: 39.3 % (ref 35–46)
HDLC SERPL-MCNC: 35.4 MG/DL (ref 40–60)
HGB BLD-MCNC: 13.4 GM/DL (ref 11.6–15.3)
IRON (FE): 60 MCG/DL (ref 50–170)
LDLC SERPL-MCNC: 98 MG/DL (ref 0–99)
LYMPHOCYTES # BLD AUTO: 0.2 TH/MM3 (ref 1–4.8)
LYMPHOCYTES NFR BLD AUTO: 14.2 % (ref 9–44)
LYMPHOCYTES: 9 % (ref 9–44)
MCH RBC QN AUTO: 29 PG (ref 27–34)
MCHC RBC AUTO-ENTMCNC: 34.2 % (ref 32–36)
MCV RBC AUTO: 84.9 FL (ref 80–100)
MONOCYTE #: 0.1 TH/MM3 (ref 0–0.9)
MONOCYTES NFR BLD: 5.7 % (ref 0–8)
MONOCYTES: 2 % (ref 0–8)
NEUTROPHILS # BLD AUTO: 1.2 TH/MM3 (ref 1.8–7.7)
NEUTROPHILS NFR BLD AUTO: 80 % (ref 16–70)
NEUTS BAND # BLD MANUAL: 1.3 TH/MM3 (ref 1.8–7.7)
NEUTS BAND NFR BLD: 4 % (ref 0–6)
NEUTS SEG NFR BLD MANUAL: 84 % (ref 16–70)
OVALOCYTES BLD QL SMEAR: (no result)
PLATELET # BLD: 112 TH/MM3 (ref 150–450)
PMV BLD AUTO: 8.7 FL (ref 7–11)
PROT SERPL-MCNC: 8.1 GM/DL (ref 6.4–8.2)
RBC # BLD AUTO: 4.62 MIL/MM3 (ref 4–5.3)
SODIUM SERPL-SCNC: 136 MEQ/L (ref 136–145)
TIBC SERPL-MCNC: 185 MCG/DL (ref 250–450)
TRIGL SERPL-MCNC: 115 MG/DL (ref 42–150)
VIT B12 SERPL-MCNC: 897 PG/ML (ref 193–986)
WBC # BLD AUTO: 1.5 TH/MM3 (ref 4–11)

## 2018-05-17 RX ADMIN — Medication SCH ML: at 09:08

## 2018-05-17 RX ADMIN — Medication SCH ML: at 20:27

## 2018-05-17 RX ADMIN — INSULIN ASPART SCH: 100 INJECTION, SOLUTION INTRAVENOUS; SUBCUTANEOUS at 20:27

## 2018-05-17 RX ADMIN — PHENYTOIN SODIUM SCH MLS/HR: 50 INJECTION INTRAMUSCULAR; INTRAVENOUS at 18:13

## 2018-05-17 RX ADMIN — PRAVASTATIN SODIUM SCH MG: 20 TABLET ORAL at 09:06

## 2018-05-17 RX ADMIN — NYSTATIN AND TRIAMCINOLONE ACETONIDE SCH APPLIC: 100000; 1 OINTMENT TOPICAL at 20:28

## 2018-05-17 RX ADMIN — METHYLPREDNISOLONE SODIUM SUCCINATE SCH MG: 40 INJECTION, POWDER, FOR SOLUTION INTRAMUSCULAR; INTRAVENOUS at 22:33

## 2018-05-17 RX ADMIN — PHENYTOIN SODIUM SCH MLS/HR: 50 INJECTION INTRAMUSCULAR; INTRAVENOUS at 06:59

## 2018-05-17 RX ADMIN — METOPROLOL TARTRATE SCH MG: 25 TABLET, FILM COATED ORAL at 20:27

## 2018-05-17 RX ADMIN — METHYLPREDNISOLONE SODIUM SUCCINATE SCH MG: 40 INJECTION, POWDER, FOR SOLUTION INTRAMUSCULAR; INTRAVENOUS at 09:06

## 2018-05-17 RX ADMIN — INSULIN ASPART SCH: 100 INJECTION, SOLUTION INTRAVENOUS; SUBCUTANEOUS at 13:29

## 2018-05-17 RX ADMIN — METOPROLOL TARTRATE SCH MG: 25 TABLET, FILM COATED ORAL at 09:07

## 2018-05-17 RX ADMIN — INSULIN ASPART SCH: 100 INJECTION, SOLUTION INTRAVENOUS; SUBCUTANEOUS at 08:00

## 2018-05-17 RX ADMIN — NYSTATIN AND TRIAMCINOLONE ACETONIDE SCH APPLIC: 100000; 1 OINTMENT TOPICAL at 09:00

## 2018-05-17 RX ADMIN — INSULIN ASPART SCH: 100 INJECTION, SOLUTION INTRAVENOUS; SUBCUTANEOUS at 17:00

## 2018-05-17 RX ADMIN — LOSARTAN POTASSIUM SCH MG: 50 TABLET, FILM COATED ORAL at 09:07

## 2018-05-17 RX ADMIN — ASPIRIN 81 MG SCH MG: 81 TABLET ORAL at 09:06

## 2018-05-17 RX ADMIN — LEVOTHYROXINE SODIUM SCH MCG: 50 TABLET ORAL at 06:41

## 2018-05-17 NOTE — ECHRPT
Indication:   EF ASSESSMENT

 

 CONCLUSIONS

 Normal left ventricular size. 

 Wall thickness is measured at the upper limits of normal. 

 The left ventricular systolic function is normal with an estimated ejection fraction in the range of
 50-55%. 

 No regional wall motion abnormalities are present. 

 

 No significant valvular abnormalities.

 

 

 BP:  106   / 63      HR: 100                      Rhythm:           Sinus

 

 MEASUREMENTS  (Male / Female) Normal Values       Technical Quality:Good

 2D ECHO

 LV Diastolic Diameter PLAX        3.9 cm                4.2 - 5.9 / 3.9 - 5.3 cm

 LV Systolic Diameter PLAX         3.1 cm                

 IVS Diastolic Thickness           0.9 cm                0.6 - 1.0 / 0.6 - 0.9 cm

 LVPW Diastolic Thickness          0.9 cm                0.6 - 1.0 / 0.6 - 0.9 cm

 LV Relative Wall Thickness        0.4                   

 RV Internal Dim ED PLAX           2.1 cm                

 LV Ejection Fraction MOD BP       49.2 %                >= 55  %

 LV Cardiac Index MOD BP           2262.4 cm/minm     

 LV Ejection Fraction MOD 4C       48.5 %                

 LV Cardiac Index MOD 4C           2574.5 cm/minm     

 LV Ejection Fraction 4C AL        50.9 %                

 LV Cardiac Index 4C AL            2733.8 cm/minm     

 LV Ejection Fraction MOD 2C       53.1 %                

 LV Cardiac Index MOD 2C           2028.4 cm/minm     

 LV Ejection Fraction 2C AL        57.7 %                

 LV Cardiac Index 2C AL            2228.3 cm/minm     

 

 

 FINDINGS

 

 LEFT VENTRICLE

 Normal left ventricular size. 

 Wall thickness is measured at the upper limits of normal. 

 The left ventricular systolic function is normal with an estimated ejection fraction in the range of
 50-55%. 

 No regional wall motion abnormalities are present. 

 

 RIGHT VENTRICLE

 Normal right ventricular size and systolic function.  

 

 LEFT ATRIUM

 The left atrial size is normal.  

 

 RIGHT ATRIUM

 The right atrial size is normal.  

 

 ATRIAL SEPTUM

 Normal atrial septal thickness without atrial level shunting by limited color doppler interrogation.
  

 

 AORTA

 The aortic root and proximal ascending aorta are normal in size on limited imaging.  

 

 MITRAL VALVE

 Structurally normal mitral valve. No mitral valve stenosis or regurgitation.  

 

 AORTIC VALVE

 Trileaflet aortic valve. No aortic valve stenosis or regurgitation.  

 

 TRICUSPID VALVE

 Structurally normal tricuspid valve. No tricuspid valve stenosis or regurgitation.  

 

 PULMONARY VALVE

 The pulmonary valve is not well visualized.  

 

 VESSELS

 The inferior vena cava is normal in size.  

 

 PERICARDIUM

 No pericardial effusion.  

 

 

 

 

  Bill Martinez MD

  (Electronically Signed)

  Final Date:17 May 2018 16:30

## 2018-05-17 NOTE — HHI.PR
Subjective


Remarks


Patient is starting to feel better.  Leukopenia seen this morning which is 

progressed from yesterday.  No fevers overnight.  Echocardiogram pending.





Objective





Vital Signs








  Date Time  Temp Pulse Resp B/P (MAP) Pulse Ox O2 Delivery O2 Flow Rate FiO2


 


5/17/18 12:43 98.0 59 16 123/57 (79) 98   


 


5/17/18 11:53  69      


 


5/17/18 07:55  66      


 


5/17/18 07:32 98.0 65 16 124/68 (86) 100   


 


5/17/18 03:52 97.7 68 18 137/65 (89) 100   


 


5/17/18 00:32 97.8 66 18 153/84 (107) 100   


 


5/16/18 20:20 98.7 82 18 112/58 98   


 


5/16/18 19:05 98.9 88 20 110/60 (77) 98   


 


5/16/18 17:47 98.1 90 18 110/63 100   


 


5/16/18 17:21 98.4 86 18 108/59 100   


 


5/16/18 15:53 99.4 106 18 96/54 (68) 97   


 


5/16/18 13:52 98.7 97 18 109/62 99   


 


5/16/18 13:29 98.4 96 18 106/61 100   


 


5/16/18 13:07 98.3 100 18 106/63 100   














I/O      


 


 5/16/18 5/16/18 5/16/18 5/17/18 5/17/18 5/17/18





 07:00 15:00 23:00 07:00 15:00 23:00


 


Intake Total  10 ml 820 ml   


 


Balance  10 ml 820 ml   


 


      


 


Intake Packed Cells   800 ml   


 


Blood Product IV Normal Saline Flush  10 ml 20 ml   


 


# Voids   2   








Result Diagram:  


5/17/18 0844                                                                   

             5/17/18 0844





Objective Remarks


GENERAL: NAD, A&Ox3


HEAD: Normocephalic. 


NECK: Supple, trachea midline. No lymphadenopathy.


EYES: No scleral icterus. No injection or drainage. 


CARDIOVASCULAR: Regular rate and rhythm without murmurs, gallops, or rubs. 


RESPIRATORY: Breath sounds equal bilaterally. No accessory muscle use.


GASTROINTESTINAL: Abdomen soft, non-tender, nondistended. 


MUSCULOSKELETAL: No cyanosis, or edema.  Joint tenderness with range of motion (

decreased).


SKIN: Warm and dry.


NEURO:  No focal neurological deficitis.





A/P


Problem List:  


(1) Symptomatic anemia


ICD Code:  D64.9 - Anemia, unspecified


Status:  Acute


(2) Lupus


ICD Code:  L93.0 - Discoid lupus erythematosus


Status:  Acute


(3) Acute anemia


ICD Code:  D64.9 - Anemia, unspecified


(4) Exacerbation of systemic lupus


ICD Code:  M32.9 - Systemic lupus erythematosus, unspecified


Assessment and Plan


Assessment and Plan


62-year-old female admitted secondary to lupus exacerbation with findings of 

acute anemia





Symptoms are improving.  Leukopenia is present and progressive thus far.





Leukopenia


Etiology may be related to lupus and/or a paroxysmal effect from steroids.


Continue to monitor


Consider hematology consult if progression continues tomorrow





Lupus exacerbation


Rheumatoid arthritis


Weakness


Start systemic steroids


Check echocardiogram for carditis or pericardial effusion, given shortness of 

breath


PT


IV Hydration for now


Follow for improvements





Acute anemia on chronic anemia


Shortness of breath


2 units of packed red blood cells ordered for transfusion


Echocardiogram as above


Check stool for blood


Check iron studies


Check B12





Hypothyroidism


Recent blood check as an outpatient was within normal limits


Continue supplementation


Follow as an outpatient





Hyperlipidemia


Continue present treatment


Follow as an outpatient





Hypertension


Continue baseline treatments


Follow blood pressures





Diabetes mellitus type 2


Follow blood sugars


Insulin sliding scale


Diabetic diet





DVT prophylaxis


SCDs











Jude Charles MD May 17, 2018 13:01

## 2018-05-18 VITALS
RESPIRATION RATE: 19 BRPM | DIASTOLIC BLOOD PRESSURE: 72 MMHG | SYSTOLIC BLOOD PRESSURE: 142 MMHG | OXYGEN SATURATION: 100 % | TEMPERATURE: 95.5 F | HEART RATE: 60 BPM

## 2018-05-18 VITALS
DIASTOLIC BLOOD PRESSURE: 69 MMHG | RESPIRATION RATE: 18 BRPM | TEMPERATURE: 96 F | HEART RATE: 52 BPM | OXYGEN SATURATION: 98 % | SYSTOLIC BLOOD PRESSURE: 134 MMHG

## 2018-05-18 VITALS
TEMPERATURE: 97.4 F | RESPIRATION RATE: 16 BRPM | OXYGEN SATURATION: 99 % | SYSTOLIC BLOOD PRESSURE: 136 MMHG | DIASTOLIC BLOOD PRESSURE: 65 MMHG | HEART RATE: 54 BPM

## 2018-05-18 VITALS — HEART RATE: 56 BPM

## 2018-05-18 VITALS — HEART RATE: 57 BPM

## 2018-05-18 LAB
ALBUMIN SERPL-MCNC: 2.2 GM/DL (ref 3.4–5)
ALP SERPL-CCNC: 68 U/L (ref 45–117)
ALT SERPL-CCNC: 52 U/L (ref 10–53)
AST SERPL-CCNC: 69 U/L (ref 15–37)
BASOPHILS # BLD AUTO: 0 TH/MM3 (ref 0–0.2)
BASOPHILS NFR BLD: 0.1 % (ref 0–2)
BILIRUB SERPL-MCNC: 0.2 MG/DL (ref 0.2–1)
BUN SERPL-MCNC: 24 MG/DL (ref 7–18)
CALCIUM SERPL-MCNC: 7.9 MG/DL (ref 8.5–10.1)
CHLORIDE SERPL-SCNC: 107 MEQ/L (ref 98–107)
CREAT SERPL-MCNC: 0.58 MG/DL (ref 0.5–1)
EOSINOPHIL # BLD: 0 TH/MM3 (ref 0–0.4)
EOSINOPHIL NFR BLD: 0 % (ref 0–4)
ERYTHROCYTE [DISTWIDTH] IN BLOOD BY AUTOMATED COUNT: 17 % (ref 11.6–17.2)
GFR SERPLBLD BASED ON 1.73 SQ M-ARVRAT: 105 ML/MIN (ref 89–?)
GLUCOSE SERPL-MCNC: 157 MG/DL (ref 74–106)
HCO3 BLD-SCNC: 23.1 MEQ/L (ref 21–32)
HCT VFR BLD CALC: 38.5 % (ref 35–46)
HGB BLD-MCNC: 12.9 GM/DL (ref 11.6–15.3)
LYMPHOCYTES # BLD AUTO: 0.2 TH/MM3 (ref 1–4.8)
LYMPHOCYTES NFR BLD AUTO: 7.4 % (ref 9–44)
MCH RBC QN AUTO: 28.8 PG (ref 27–34)
MCHC RBC AUTO-ENTMCNC: 33.5 % (ref 32–36)
MCV RBC AUTO: 86.1 FL (ref 80–100)
MONOCYTE #: 0.2 TH/MM3 (ref 0–0.9)
MONOCYTES NFR BLD: 8.5 % (ref 0–8)
NEUTROPHILS # BLD AUTO: 2.2 TH/MM3 (ref 1.8–7.7)
NEUTROPHILS NFR BLD AUTO: 84 % (ref 16–70)
PLATELET # BLD: 145 TH/MM3 (ref 150–450)
PMV BLD AUTO: 10 FL (ref 7–11)
PROT SERPL-MCNC: 7.5 GM/DL (ref 6.4–8.2)
RBC # BLD AUTO: 4.47 MIL/MM3 (ref 4–5.3)
SODIUM SERPL-SCNC: 139 MEQ/L (ref 136–145)
WBC # BLD AUTO: 2.7 TH/MM3 (ref 4–11)

## 2018-05-18 RX ADMIN — METHYLPREDNISOLONE SODIUM SUCCINATE SCH MG: 40 INJECTION, POWDER, FOR SOLUTION INTRAMUSCULAR; INTRAVENOUS at 13:15

## 2018-05-18 RX ADMIN — INSULIN ASPART SCH: 100 INJECTION, SOLUTION INTRAVENOUS; SUBCUTANEOUS at 13:00

## 2018-05-18 RX ADMIN — ASPIRIN 81 MG SCH MG: 81 TABLET ORAL at 08:34

## 2018-05-18 RX ADMIN — Medication SCH ML: at 08:35

## 2018-05-18 RX ADMIN — LEVOTHYROXINE SODIUM SCH MCG: 50 TABLET ORAL at 05:46

## 2018-05-18 RX ADMIN — INSULIN ASPART SCH: 100 INJECTION, SOLUTION INTRAVENOUS; SUBCUTANEOUS at 08:00

## 2018-05-18 RX ADMIN — PRAVASTATIN SODIUM SCH MG: 20 TABLET ORAL at 08:34

## 2018-05-18 RX ADMIN — NYSTATIN AND TRIAMCINOLONE ACETONIDE SCH APPLIC: 100000; 1 OINTMENT TOPICAL at 09:00

## 2018-05-18 RX ADMIN — LOSARTAN POTASSIUM SCH MG: 50 TABLET, FILM COATED ORAL at 08:35

## 2018-05-18 RX ADMIN — METOPROLOL TARTRATE SCH MG: 25 TABLET, FILM COATED ORAL at 13:09

## 2018-05-18 RX ADMIN — PHENYTOIN SODIUM SCH MLS/HR: 50 INJECTION INTRAMUSCULAR; INTRAVENOUS at 05:46

## 2018-05-18 NOTE — HHI.DS
__________________________________________________





Discharge Summary


Admission Date


May 16, 2018 at 11:01


Discharge Date:  May 18, 2018


Admitting Diagnosis





Symptomatic anemia, lupus





(1) Acute anemia


ICD Code:  D64.9 - Anemia, unspecified


(2) Exacerbation of systemic lupus


ICD Code:  M32.9 - Systemic lupus erythematosus, unspecified


(3) Lupus


ICD Code:  L93.0 - Discoid lupus erythematosus


Status:  Acute


(4) Symptomatic anemia


ICD Code:  D64.9 - Anemia, unspecified


Status:  Acute


Procedures


None


Brief History - From Admission


Mrs. Bustamante is a 62 year old female.  She has a history of lupus.  Other medical 

conditions include diabetes mellitus type 2 and hypothyroidism.  She says that 

recently she has been feeling weak, increased joint pains, chronic nausea, 

decreased appetite, fatigue, rash, and weight loss.  Previously she had been on 

Plaquenil and steroids to help control her lupus.  She had discontinued these 

treatments about 1.5 to 2 years ago.  For about a year she had felt well but in 

the past 6 months to 12 months she has had a gradual decline.  Recently she has 

been having shortness of breath which became concerning to her family and she 

was encouraged to come visit the ER.  She was found to be anemic and shows 

multiple evidence use of lupus exacerbation.  Etiology for her anemia may be 

related to poor p.o. intake combined with inflammation and chronic disease.  

Further studies are pending.  No other complaints at this time.


CBC/BMP:  


5/18/18 1147                                                                   

             5/18/18 1147





Significant Findings





Laboratory Tests








Test


  5/16/18


08:16 5/16/18


11:13 5/17/18


08:44 5/18/18


11:47


 


White Blood Count


  3.4 TH/MM3


(4.0-11.0) 


  1.5 TH/MM3


(4.0-11.0) 2.7 TH/MM3


(4.0-11.0)


 


Red Blood Count


  3.04 MIL/MM3


(4.00-5.30) 


  


  


 


 


Hemoglobin


  8.4 GM/DL


(11.6-15.3) 


  


  


 


 


Hematocrit


  25.4 %


(35.0-46.0) 


  


  


 


 


Neutrophils (%) (Auto)


  91.6 %


(16.0-70.0) 


  80.0 %


(16.0-70.0) 84.0 %


(16.0-70.0)


 


Lymphocytes (%) (Auto)


  4.4 %


(9.0-44.0) 


  


  7.4 %


(9.0-44.0)


 


Lymphocytes # (Auto)


  0.2 TH/MM3


(1.0-4.8) 


  0.2 TH/MM3


(1.0-4.8) 0.2 TH/MM3


(1.0-4.8)


 


Random Glucose


  149 MG/DL


() 


  139 MG/DL


() 157 MG/DL


()


 


Total Protein


  9.3 GM/DL


(6.4-8.2) 


  


  


 


 


Albumin


  2.3 GM/DL


(3.4-5.0) 


  2.2 GM/DL


(3.4-5.0) 2.2 GM/DL


(3.4-5.0)


 


Calcium Level


  8.1 MG/DL


(8.5-10.1) 


  8.0 MG/DL


(8.5-10.1) 7.9 MG/DL


(8.5-10.1)


 


Aspartate Amino Transf


(AST/SGOT) 94 U/L (15-37) 


  


  62 U/L (15-37) 


  69 U/L (15-37) 


 


 


Sodium Level


  130 MEQ/L


(136-145) 


  


  


 


 


Urine Protein


  


  300 mg/dL


(NEG-TRACE) 


  


 


 


Urine Ketones


  


  TRACE mg/dL


(NEG) 


  


 


 


Urine Occult Blood  MOD (NEG)   


 


Urine Leukocyte Esterase  TRACE (NEG)   


 


Urine RBC  13 /hpf (0-3)   


 


Urine Bacteria


  


  RARE /hpf


(NONE) 


  


 


 


Urine Mucus  FEW /lpf (OCC)   


 


Platelet Count


  


  


  112 TH/MM3


(150-450) 145 TH/MM3


(150-450)


 


Neutrophils # (Auto)


  


  


  1.2 TH/MM3


(1.8-7.7) 


 


 


Neutrophils % (Manual)   84 % (16-70)  


 


Neutrophils # (Manual)


  


  


  1.3 TH/MM3


(1.8-7.7) 


 


 


Platelet Estimate   LOW (NORMAL)  


 


Tear Drop Cells   1+ (NORMAL)  


 


Ovalocytes   1+ (NORMAL)  


 


Erythrocyte Sedimentation Rate


  


  


  77 mm/hr


(0-30) 


 


 


Blood Urea Nitrogen


  


  


  20 MG/DL


(7-18) 24 MG/DL


(7-18)


 


Total Iron Binding Capacity


  


  


  185 MCG/DL


(250-450) 


 


 


C-Reactive Protein


  


  


  1.30 MG/DL


(0.00-0.30) 


 


 


HDL Cholesterol


  


  


  35.4 MG/DL


(40.0-60.0) 


 


 


Monocytes (%) (Auto)


  


  


  


  8.5 %


(0.0-8.0)


 


Potassium Level


  


  


  


  3.3 MEQ/L


(3.5-5.1)








Hospital Course


Mrs. Bustamante is a 62-year-old female.  She was admitted secondary to lupus 

exacerbation.  She also had a low hemoglobin and was transfused packed red 

blood cells.  Her anemia has corrected.  With systemic steroids her 

inflammatory symptoms have improved.  She had paroxysmal decrease in her white 

blood cell count which may be related to lupus and/or steroids.  This was 

monitored overnight and she has an upward trend this morning.  She continues to 

improve in regards to her joint pains and malaise.  She will continue on 

steroids with a taper over 2 weeks.  At this point she is medically clear and 

stable for discharge home.  Outpatient follow-up with PCP regarding optimize 

management for her autoimmunity.


Pt Condition on Discharge:  Stable


Discharge Disposition:  Discharge Home


Discharge Time:  <= 30 minutes


Discharge Instructions


DIET: Follow Instructions for:  As Tolerated, No Restrictions


Activities you can perform:  Regular-No Restrictions


Follow up Referrals:  


PCP Follow-up - 2 Weeks





New Medications:  


Prednisone (Prednisone) 10 Mg Tab


10 MG PO AS DIRECTED for Inflammation, #8 TAB 0 Refills


Taper Treatment:


 1 pill twice a day for Day 1-2


 1 pill once a day for Day 3-4


 1/2 pill once a day for Day 5-8


 Then stop


Prednisone (Prednisone) 20 Mg Tab


20 MG PO BID for Inflammation, #14 TAB 0 Refills


Use this treatment first, then use taper treatment.


Prednisone (Prednisone) 10 Mg Tab


10 MG PO DAILY for Inflammation, #30 TAB 1 Refill


Use this only IF Bolus and Taper of prednisone fails.


 


Continued Medications:  


Aspirin (Aspirin Low Dose) 81 Mg Chew


81 MG CHEW DAILY, TAB 0 Refills





Levothyroxine (Levothyroxine) 50 Mcg Tab


50 MCG PO DAILY for Thyroid, #30 TAB 0 Refills





Losartan (Losartan) 50 Mg Tab


50 MG PO DAILY for Blood Pressure Management, #30 TAB 0 Refills





Lovastatin (Lovastatin) 20 Mg Tab


20 MG PO DAILY for Cholesterol Management, #30 TAB 0 Refills





Metformin (Metformin) 500 Mg Tab


500 MG PO BIDPC for Blood Sugar Management, #60 TAB 0 Refills





Metoprolol Tartrate (Metoprolol Tartrate) 25 Mg Tab


25 MG PO BID, #60 TAB 0 Refills





Nystatin-Triamcinolone (Nystatin-Triamcinolone) 100,000-0.1 Unit/Gm Oint


1 APPLIC TOPICAL Q12HR for Infection, #60 GM 2 Refills





Tramadol (Tramadol) 50 Mg Tab


50 MG PO Q8H PRN for PAIN, #15 TAB 0 Refills

















Jude Charles MD May 18, 2018 14:37

## 2018-05-22 ENCOUNTER — HOSPITAL ENCOUNTER (OUTPATIENT)
Dept: HOSPITAL 17 - NEPC | Age: 63
Setting detail: OBSERVATION
LOS: 1 days | Discharge: HOME | End: 2018-05-23
Attending: HOSPITALIST | Admitting: HOSPITALIST
Payer: COMMERCIAL

## 2018-05-22 VITALS
HEART RATE: 57 BPM | TEMPERATURE: 98 F | DIASTOLIC BLOOD PRESSURE: 84 MMHG | OXYGEN SATURATION: 96 % | SYSTOLIC BLOOD PRESSURE: 175 MMHG | RESPIRATION RATE: 20 BRPM

## 2018-05-22 VITALS
HEART RATE: 62 BPM | OXYGEN SATURATION: 99 % | TEMPERATURE: 98.1 F | RESPIRATION RATE: 14 BRPM | SYSTOLIC BLOOD PRESSURE: 198 MMHG | DIASTOLIC BLOOD PRESSURE: 93 MMHG

## 2018-05-22 VITALS — SYSTOLIC BLOOD PRESSURE: 198 MMHG | DIASTOLIC BLOOD PRESSURE: 95 MMHG | HEART RATE: 57 BPM

## 2018-05-22 VITALS — WEIGHT: 88.18 LBS | HEIGHT: 59 IN | BODY MASS INDEX: 17.78 KG/M2

## 2018-05-22 VITALS — OXYGEN SATURATION: 96 %

## 2018-05-22 VITALS — DIASTOLIC BLOOD PRESSURE: 90 MMHG | SYSTOLIC BLOOD PRESSURE: 183 MMHG | HEART RATE: 66 BPM

## 2018-05-22 DIAGNOSIS — E11.9: ICD-10-CM

## 2018-05-22 DIAGNOSIS — Z90.710: ICD-10-CM

## 2018-05-22 DIAGNOSIS — E03.9: ICD-10-CM

## 2018-05-22 DIAGNOSIS — Z82.49: ICD-10-CM

## 2018-05-22 DIAGNOSIS — Z79.899: ICD-10-CM

## 2018-05-22 DIAGNOSIS — E78.5: ICD-10-CM

## 2018-05-22 DIAGNOSIS — E78.00: ICD-10-CM

## 2018-05-22 DIAGNOSIS — Z82.3: ICD-10-CM

## 2018-05-22 DIAGNOSIS — R07.89: Primary | ICD-10-CM

## 2018-05-22 DIAGNOSIS — I10: ICD-10-CM

## 2018-05-22 LAB
ALBUMIN SERPL-MCNC: 2.4 GM/DL (ref 3.4–5)
ALP SERPL-CCNC: 62 U/L (ref 45–117)
ALT SERPL-CCNC: 41 U/L (ref 10–53)
AST SERPL-CCNC: 33 U/L (ref 15–37)
BASOPHILS # BLD AUTO: 0 TH/MM3 (ref 0–0.2)
BASOPHILS NFR BLD: 0.3 % (ref 0–2)
BILIRUB SERPL-MCNC: 0.7 MG/DL (ref 0.2–1)
BUN SERPL-MCNC: 13 MG/DL (ref 7–18)
CALCIUM SERPL-MCNC: 8.3 MG/DL (ref 8.5–10.1)
CHLORIDE SERPL-SCNC: 101 MEQ/L (ref 98–107)
CREAT SERPL-MCNC: 0.35 MG/DL (ref 0.5–1)
EOSINOPHIL # BLD: 0 TH/MM3 (ref 0–0.4)
EOSINOPHIL NFR BLD: 0 % (ref 0–4)
ERYTHROCYTE [DISTWIDTH] IN BLOOD BY AUTOMATED COUNT: 16.9 % (ref 11.6–17.2)
GFR SERPLBLD BASED ON 1.73 SQ M-ARVRAT: 188 ML/MIN (ref 89–?)
GLUCOSE SERPL-MCNC: 127 MG/DL (ref 74–106)
HCO3 BLD-SCNC: 25.3 MEQ/L (ref 21–32)
HCT VFR BLD CALC: 39.4 % (ref 35–46)
HGB BLD-MCNC: 13.2 GM/DL (ref 11.6–15.3)
INR PPP: 1 RATIO
LYMPHOCYTES # BLD AUTO: 0.2 TH/MM3 (ref 1–4.8)
LYMPHOCYTES NFR BLD AUTO: 2.3 % (ref 9–44)
MAGNESIUM SERPL-MCNC: 1.7 MG/DL (ref 1.5–2.5)
MCH RBC QN AUTO: 29 PG (ref 27–34)
MCHC RBC AUTO-ENTMCNC: 33.6 % (ref 32–36)
MCV RBC AUTO: 86.1 FL (ref 80–100)
MONOCYTE #: 0.2 TH/MM3 (ref 0–0.9)
MONOCYTES NFR BLD: 2.9 % (ref 0–8)
NEUTROPHILS # BLD AUTO: 7.1 TH/MM3 (ref 1.8–7.7)
NEUTROPHILS NFR BLD AUTO: 94.5 % (ref 16–70)
PLATELET # BLD: 157 TH/MM3 (ref 150–450)
PMV BLD AUTO: 8.8 FL (ref 7–11)
PROT SERPL-MCNC: 7.3 GM/DL (ref 6.4–8.2)
PROTHROMBIN TIME: 10.3 SEC (ref 9.8–11.6)
RBC # BLD AUTO: 4.57 MIL/MM3 (ref 4–5.3)
SODIUM SERPL-SCNC: 136 MEQ/L (ref 136–145)
TROPONIN I SERPL-MCNC: (no result) NG/ML (ref 0.02–0.05)
WBC # BLD AUTO: 7.5 TH/MM3 (ref 4–11)

## 2018-05-22 PROCEDURE — A9502 TC99M TETROFOSMIN: HCPCS

## 2018-05-22 PROCEDURE — 96374 THER/PROPH/DIAG INJ IV PUSH: CPT

## 2018-05-22 PROCEDURE — 96376 TX/PRO/DX INJ SAME DRUG ADON: CPT

## 2018-05-22 PROCEDURE — 85025 COMPLETE CBC W/AUTO DIFF WBC: CPT

## 2018-05-22 PROCEDURE — 99285 EMERGENCY DEPT VISIT HI MDM: CPT

## 2018-05-22 PROCEDURE — 78452 HT MUSCLE IMAGE SPECT MULT: CPT

## 2018-05-22 PROCEDURE — 85610 PROTHROMBIN TIME: CPT

## 2018-05-22 PROCEDURE — 70450 CT HEAD/BRAIN W/O DYE: CPT

## 2018-05-22 PROCEDURE — 82550 ASSAY OF CK (CPK): CPT

## 2018-05-22 PROCEDURE — 85730 THROMBOPLASTIN TIME PARTIAL: CPT

## 2018-05-22 PROCEDURE — 84484 ASSAY OF TROPONIN QUANT: CPT

## 2018-05-22 PROCEDURE — 71045 X-RAY EXAM CHEST 1 VIEW: CPT

## 2018-05-22 PROCEDURE — 80053 COMPREHEN METABOLIC PANEL: CPT

## 2018-05-22 PROCEDURE — 93017 CV STRESS TEST TRACING ONLY: CPT

## 2018-05-22 PROCEDURE — G0378 HOSPITAL OBSERVATION PER HR: HCPCS

## 2018-05-22 PROCEDURE — 73030 X-RAY EXAM OF SHOULDER: CPT

## 2018-05-22 PROCEDURE — 97162 PT EVAL MOD COMPLEX 30 MIN: CPT

## 2018-05-22 PROCEDURE — 83735 ASSAY OF MAGNESIUM: CPT

## 2018-05-22 PROCEDURE — 82948 REAGENT STRIP/BLOOD GLUCOSE: CPT

## 2018-05-22 PROCEDURE — 93005 ELECTROCARDIOGRAM TRACING: CPT

## 2018-05-22 RX ADMIN — MORPHINE SULFATE PRN MG: 2 INJECTION, SOLUTION INTRAMUSCULAR; INTRAVENOUS at 23:21

## 2018-05-22 RX ADMIN — Medication SCH ML: at 20:33

## 2018-05-22 NOTE — PD
Data


Data


Last Documented VS





Vital Signs








  Date Time  Temp Pulse Resp B/P (MAP) Pulse Ox O2 Delivery O2 Flow Rate FiO2


 


5/22/18 20:06  66  183/90 (121)    


 


5/22/18 19:11   20     


 


5/22/18 17:13 98.1    99 Room Air  








Orders





 Orders


Complete Blood Count With Diff (5/22/18 16:23)


Comprehensive Metabolic Panel (5/22/18 16:23)


Prothrombin Time / Inr (Pt) (5/22/18 16:23)


Act Partial Throm Time (Ptt) (5/22/18 16:23)


Ct Brain W/O Iv Contrast(Rout) (5/22/18 16:23)


Ecg Monitoring (5/22/18 16:23)


Iv Access Insert/Monitor (5/22/18 16:23)


Oximetry (5/22/18 16:23)


Sodium Chloride 0.9% Flush (Ns Flush) (5/22/18 16:30)


Electrocardiogram (5/22/18 )


Chest, Single Ap (5/22/18 16:29)


Bilateral Bp Monitoring (5/22/18 16:29)


Sodium Chloride 0.9% Flush (Ns Flush) (5/22/18 16:30)


Nitroglycerin Sl (Nitrostat Sl) (5/22/18 16:45)


Ckmb (Isoenzyme) Profile (5/22/18 16:55)


Magnesium (Mg) (5/22/18 16:55)


Troponin I (5/22/18 16:55)


Tramadol (Ultram) (5/22/18 18:00)


Ondansetron  Odt (Zofran  Odt) (5/22/18 18:15)


Acetaminophen (Tylenol) (5/22/18 19:30)


Metoprolol Tartrate (Lopressor) (5/22/18 19:45)


Oxycodone-Acetamin 5-325 Mg (Percocet (5/22/18 19:45)


Morphine Inj (Morphine Inj) (5/22/18 19:45)


Place In Observation (5/22/18 )


Vital Signs (Adult) Q4H (5/22/18 20:16)


Activity Oob With Assistance (5/22/18 20:16)


Cardiac Monitor / Telemetry .CONTINUOUS (5/22/18 20:16)


Diet Heart Healthy (5/23/18 Breakfast)


Sodium Chloride 0.9% Flush (Ns Flush) (5/22/18 20:30)


Sodium Chloride 0.9% Flush (Ns Flush) (5/22/18 21:00)


Acetaminophen (Tylenol) (5/22/18 20:30)


Ondansetron Inj (Zofran Inj) (5/22/18 20:30)


Troponin I (5/22/18 20:16)


Troponin I (5/23/18 02:16)


Electrocardiogram (5/22/18 20:16)


Electrocardiogram (5/23/18 02:16)


Resp Oxygen Ru C Titrat 1-4 L (5/22/18 )


Scd Bilateral/Knee High BUCKY.BID (5/22/18 20:16)


Naloxone Inj (Narcan Inj) (5/22/18 20:30)


Magnesium Hydroxide Liq (Milk Of Magnesi (5/22/18 20:30)


Sennosides (Senokot) (5/22/18 20:30)


Bisacodyl Supp (Dulcolax Supp) (5/22/18 20:30)


Lactulose Liq (Lactulose Liq) (5/22/18 20:30)





Labs





Laboratory Tests








Test


  5/22/18


16:55


 


White Blood Count 7.5 TH/MM3 


 


Red Blood Count 4.57 MIL/MM3 


 


Hemoglobin 13.2 GM/DL 


 


Hematocrit 39.4 % 


 


Mean Corpuscular Volume 86.1 FL 


 


Mean Corpuscular Hemoglobin 29.0 PG 


 


Mean Corpuscular Hemoglobin


Concent 33.6 % 


 


 


Red Cell Distribution Width 16.9 % 


 


Platelet Count 157 TH/MM3 


 


Mean Platelet Volume 8.8 FL 


 


Neutrophils (%) (Auto) 94.5 % 


 


Lymphocytes (%) (Auto) 2.3 % 


 


Monocytes (%) (Auto) 2.9 % 


 


Eosinophils (%) (Auto) 0.0 % 


 


Basophils (%) (Auto) 0.3 % 


 


Neutrophils # (Auto) 7.1 TH/MM3 


 


Lymphocytes # (Auto) 0.2 TH/MM3 


 


Monocytes # (Auto) 0.2 TH/MM3 


 


Eosinophils # (Auto) 0.0 TH/MM3 


 


Basophils # (Auto) 0.0 TH/MM3 


 


CBC Comment DIFF FINAL 


 


Differential Comment  


 


Prothrombin Time 10.3 SEC 


 


Prothromb Time International


Ratio 1.0 RATIO 


 


 


Activated Partial


Thromboplast Time 21.3 SEC 


 


 


Blood Urea Nitrogen 13 MG/DL 


 


Creatinine 0.35 MG/DL 


 


Random Glucose 127 MG/DL 


 


Total Protein 7.3 GM/DL 


 


Albumin 2.4 GM/DL 


 


Calcium Level 8.3 MG/DL 


 


Magnesium Level 1.7 MG/DL 


 


Alkaline Phosphatase 62 U/L 


 


Aspartate Amino Transf


(AST/SGOT) 33 U/L 


 


 


Alanine Aminotransferase


(ALT/SGPT) 41 U/L 


 


 


Total Bilirubin 0.7 MG/DL 


 


Sodium Level 136 MEQ/L 


 


Potassium Level 3.9 MEQ/L 


 


Chloride Level 101 MEQ/L 


 


Carbon Dioxide Level 25.3 MEQ/L 


 


Anion Gap 10 MEQ/L 


 


Estimat Glomerular Filtration


Rate 188 ML/MIN 


 


 


Total Creatine Kinase 27 U/L 


 


Troponin I


  LESS THAN 0.02


NG/ML











MDM


Supervised Visit with REI:  Yes


Interpretation(s)


My review of EKG: Normal sinus rhythm at a rate of 60, slight leftward axis 

with anterior Q waves it could suggest an old MI.





LABS:


CBC is unremarkable.


CMP generally unremarkable.


Troponin negative.


Coags unremarkable.





Chest x-ray: Negative





CT head: Negative


Narrative Course


Is a 62-year-old woman with lupus, multiple recent hospital visits for pain, 

seem to be dyspepsia from NSAID use, with some symptomatic anemia and recent 

lupus flare.  Now with worsening pain chest stomach and left shoulder.  Thinks 

he may have hurt something moving.  She takes tramadol but denies taking 

stronger medicines on any kind of regular basis.  Still feels very 

uncomfortable of left shoulder pain.  Multiple other symptoms including this 

chest pain, left arm tingling, unsteadiness.  Workups unremarkable.  Blood 

pressure remains remains markedly elevated.  This point would recommend 

admission for observation.











Angus Estrella MD May 22, 2018 19:39

## 2018-05-22 NOTE — PD
HPI


Chief Complaint:  General Weakness


Time Seen by Provider:  16:23


Travel History


International Travel<30 days:  No


Contact w/Intl Traveler<30days:  No


Traveled to known affect area:  No





History of Present Illness


HPI


62-year-old female presents with pain that started in her chest and went up 

into her neck.  She states she felt like her arm got numb.  She states that she 

called an ambulance and they brought her here.  She states that she also got a 

little bit of a headache but that starting to go away.  She denies recurrent 

history of this.  Quality is pressure.  Severity is moderate.  She denies 

specific modifying factors.  She states when she got up to go the bathroom she 

felt a little bit wobbly.  She denies any fall.





PFSH


Past Medical History


Anemia:  Yes


Arthritis:  Yes (RA)


Autoimmune Disease:  Yes (lupus)


High Cholesterol:  Yes


Diabetes:  Yes


Hypertension:  Yes


Thyroid Disease:  Yes





Past Surgical History


Abdominal Surgery:  Yes (bladder sling)


Appendectomy:  Yes


Gynecologic Surgery:  Yes


Hysterectomy:  Yes





Social History


Alcohol Use:  No


Tobacco Use:  No


Substance Use:  No





Allergies-Medications


(Allergen,Severity, Reaction):  


Coded Allergies:  


     codeine (Verified  Allergy, Intermediate, 5/22/18)


Reported Meds & Prescriptions





Reported Meds & Active Scripts


Active


Prednisone 10 Mg Tab 10 Mg PO DAILY


     Use this only IF Bolus and Taper of prednisone fails.


Prednisone 20 Mg Tab 20 Mg PO BID


     Use this treatment first, then use taper treatment.


Prednisone 10 Mg Tab 10 Mg PO AS DIRECTED


     Taper Treatment:


     1 pill twice a day for Day 1-2


     1 pill once a day for Day 3-4


     1/2 pill once a day for Day 5-8


     Then stop


Nystatin-Triamcinolone 100,000-0.1 Unit/Gm Oint 1 Applic TOPICAL Q12HR


Tramadol (Tramadol HCl) 50 Mg Tab 50 Mg PO Q8H PRN


Reported


Levothyroxine (Levothyroxine Sodium) 50 Mcg Tab 50 Mcg PO DAILY


Metformin (Metformin HCl) 500 Mg Tab 500 Mg PO BIDPC


Aspirin Low Dose (Aspirin) 81 Mg Chew 81 Mg CHEW DAILY


Lovastatin 20 Mg Tab 20 Mg PO DAILY


Metoprolol Tartrate 25 Mg Tab 25 Mg PO BID


Losartan (Losartan Potassium) 50 Mg Tab 50 Mg PO DAILY








Review of Systems


Except as stated in HPI:  all other systems reviewed are Neg





Physical Exam


Narrative


GENERAL:  63 y/o female in no apparent distress


SKIN: Focused skin assessment warm/dry.


HEAD: Atraumatic. Normocephalic. 


EYES: Pupils equal and round. No scleral icterus. No injection or drainage. 


ENT: No nasal bleeding or discharge.  Mucous membranes pink and moist.


NECK: Trachea midline. 


CARDIOVASCULAR: Regular rate and rhythm.  


RESPIRATORY: No accessory muscle use. Clear to auscultation. Breath sounds 

equal bilaterally. 


GASTROINTESTINAL: Abdomen soft, non-tender, nondistended. 


MUSCULOSKELETAL: No obvious deformities. No clubbing.  No cyanosis. 


NEUROLOGICAL: Awake and alert. No obvious cranial nerve deficits.  Motor 

grossly within normal limits. Normal speech.  Equal grasp bilaterally


PSYCHIATRIC: Appropriate mood and affect; insight and judgment normal.





Data


Data


Last Documented VS





Vital Signs








  Date Time  Temp Pulse Resp B/P (MAP) Pulse Ox O2 Delivery O2 Flow Rate FiO2


 


5/22/18 17:13 98.1 62 14 198/93 (128) 99 Room Air  








Orders





 Orders


Complete Blood Count With Diff (5/22/18 16:23)


Comprehensive Metabolic Panel (5/22/18 16:23)


Prothrombin Time / Inr (Pt) (5/22/18 16:23)


Act Partial Throm Time (Ptt) (5/22/18 16:23)


Ct Brain W/O Iv Contrast(Rout) (5/22/18 16:23)


Ecg Monitoring (5/22/18 16:23)


Iv Access Insert/Monitor (5/22/18 16:23)


Oximetry (5/22/18 16:23)


Sodium Chloride 0.9% Flush (Ns Flush) (5/22/18 16:30)


Electrocardiogram (5/22/18 )


Electrocardiogram (5/22/18 16:29)


Chest, Single Ap (5/22/18 16:29)


Bilateral Bp Monitoring (5/22/18 16:29)


Sodium Chloride 0.9% Flush (Ns Flush) (5/22/18 16:30)


Nitroglycerin Sl (Nitrostat Sl) (5/22/18 16:45)


Ckmb (Isoenzyme) Profile (5/22/18 16:55)


Magnesium (Mg) (5/22/18 16:55)


Troponin I (5/22/18 16:55)





Labs





Laboratory Tests








Test


  5/22/18


16:55


 


White Blood Count 7.5 TH/MM3 


 


Red Blood Count 4.57 MIL/MM3 


 


Hemoglobin 13.2 GM/DL 


 


Hematocrit 39.4 % 


 


Mean Corpuscular Volume 86.1 FL 


 


Mean Corpuscular Hemoglobin 29.0 PG 


 


Mean Corpuscular Hemoglobin


Concent 33.6 % 


 


 


Red Cell Distribution Width 16.9 % 


 


Platelet Count 157 TH/MM3 


 


Mean Platelet Volume 8.8 FL 


 


Neutrophils (%) (Auto) 94.5 % 


 


Lymphocytes (%) (Auto) 2.3 % 


 


Monocytes (%) (Auto) 2.9 % 


 


Eosinophils (%) (Auto) 0.0 % 


 


Basophils (%) (Auto) 0.3 % 


 


Neutrophils # (Auto) 7.1 TH/MM3 


 


Lymphocytes # (Auto) 0.2 TH/MM3 


 


Monocytes # (Auto) 0.2 TH/MM3 


 


Eosinophils # (Auto) 0.0 TH/MM3 


 


Basophils # (Auto) 0.0 TH/MM3 


 


CBC Comment DIFF FINAL 


 


Differential Comment  


 


Prothrombin Time 10.3 SEC 


 


Prothromb Time International


Ratio 1.0 RATIO 


 


 


Activated Partial


Thromboplast Time 21.3 SEC 


 


 


Blood Urea Nitrogen 13 MG/DL 


 


Creatinine 0.35 MG/DL 


 


Random Glucose 127 MG/DL 


 


Total Protein 7.3 GM/DL 


 


Albumin 2.4 GM/DL 


 


Calcium Level 8.3 MG/DL 


 


Magnesium Level 1.7 MG/DL 


 


Alkaline Phosphatase 62 U/L 


 


Aspartate Amino Transf


(AST/SGOT) 33 U/L 


 


 


Alanine Aminotransferase


(ALT/SGPT) 41 U/L 


 


 


Total Bilirubin 0.7 MG/DL 


 


Sodium Level 136 MEQ/L 


 


Potassium Level 3.9 MEQ/L 


 


Chloride Level 101 MEQ/L 


 


Carbon Dioxide Level 25.3 MEQ/L 


 


Anion Gap 10 MEQ/L 


 


Estimat Glomerular Filtration


Rate 188 ML/MIN 


 


 


Total Creatine Kinase 27 U/L 


 


Troponin I


  LESS THAN 0.02


NG/ML











MDM


Medical Decision Making


Medical Screen Exam Complete:  Yes


Emergency Medical Condition:  Yes


Medical Record Reviewed:  Yes (pmh confirmed)


Differential Diagnosis


Atypical cardiac, anemia, hypertensive urgency, musculoskeletal


Narrative Course


Will check blood work, chest x-ray, CT brain and reevaluate





Physician Communication


Physician Communication


dr miller to follow workup and revaluate











Alyse Post MD May 22, 2018 16:43

## 2018-05-22 NOTE — EKG
Date Performed: 05/22/2018       Time Performed: 17:27:53

 

PTAGE:      62 years

 

EKG:      Sinus rhythm 

 

 POSSIBLE ANTERIOR MYOCARDIAL INFARCTION ABNORMAL ECG Compared to prior electrocardiogram, rate has d
ecreased . 

 

 PREVIOUS TRACING            : 05/16/2018 08.04

 

DOCTOR:   Moy Hernandez  Interpretating Date/Time  05/22/2018 18:49:59

## 2018-05-22 NOTE — RADRPT
EXAM DATE:  5/22/2018 5:42 PM EDT

AGE/SEX:        62 years / Female



INDICATIONS:  Chest pain.



CLINICAL DATA:  This is the patient's initial encounter. Patient reports that signs and symptoms have
 been present for 1 day and indicates a pain score of 10/10. 

                                                                          

MEDICAL/SURGICAL HISTORY:       Hypertension.  Diabetes mellitus type II. None.



COMPARISON:      No prior [facility] exams available for comparison.



FINDINGS:  A single AP view of the chest demonstrates the lungs to be symmetrically aerated without e
vidence of mass, infiltrate or effusion.  The cardiomediastinal contours are unremarkable.  Osseous s
tructures are intact.  





CONCLUSION: No acute intrathoracic disease.



Electronically signed by: Win Han MD  5/22/2018 5:43 PM EDT

## 2018-05-22 NOTE — HHI.HP
__________________________________________________





HPI


Service


[facility]


Primary Care Physician


Non-Staff


Admission Diagnosis





Left shoulder pain, headache, elevated blood pressure, lupus flare


Diagnoses:  


Chief Complaint:  


chest and shoulder pain


Travel History


International Travel<30 Days:  No


Contact w/Intl Traveler <30 Da:  No


Traveled to Known Affected Are:  No


History of Present Illness


61 y/o female with a history of lupus, HTN, HLD, hypothyroidism and DM 

presented to the ED with complaints of chest and shoulder pain. Patient states 

she may have hurt it moving something. She denies any chest pain at this time, 

but states her left shoulder pain is a 8/10, aching, intermittent, worse with 

movement, with radiation to her back and associated nausea. She states she 

vomited after she was given nitro.  She states the morphine has helped. Denies 

any sob, fever or chills.





Review of Systems


Except as stated in HPI:  all other systems reviewed are Neg





Past Family Social History


Past Medical History


Hypothyroidism


Hyperlipidemia


Hypertension


Lupus/RA


Diabetes type II


Chronic anemia


Past Surgical History


Hysterectomy


Bladder sling


Appendectomy


Reported Medications





Reported Meds & Active Scripts


Active


Prednisone 10 Mg Tab 10 Mg PO DAILY


     Use this only IF Bolus and Taper of prednisone fails.


Prednisone 20 Mg Tab 20 Mg PO BID


     Use this treatment first, then use taper treatment.


Prednisone 10 Mg Tab 10 Mg PO AS DIRECTED


     Taper Treatment:


     1 pill twice a day for Day 1-2


     1 pill once a day for Day 3-4


     1/2 pill once a day for Day 5-8


     Then stop


Nystatin-Triamcinolone 100,000-0.1 Unit/Gm Oint 1 Applic TOPICAL Q12HR


Tramadol (Tramadol HCl) 50 Mg Tab 50 Mg PO Q8H PRN


Reported


Levothyroxine (Levothyroxine Sodium) 50 Mcg Tab 50 Mcg PO DAILY


Metformin (Metformin HCl) 500 Mg Tab 500 Mg PO BIDPC


Aspirin Low Dose (Aspirin) 81 Mg Chew 81 Mg CHEW DAILY


Lovastatin 20 Mg Tab 20 Mg PO DAILY


Metoprolol Tartrate 25 Mg Tab 25 Mg PO BID


Losartan (Losartan Potassium) 50 Mg Tab 50 Mg PO DAILY


Allergies:  


Coded Allergies:  


     codeine (Verified  Allergy, Intermediate, 18)


Active Ordered Medications





Current Medications








 Medications


  (Trade)  Dose


 Ordered  Sig/Nhi


 Route  Start Time


 Stop Time Status Last Admin


 


  (NS Flush)  2 ml  UNSCH  PRN


 IV FLUSH  18 20:30


     


 


 


  (NS Flush)  2 ml  BID


 IV FLUSH  18 21:00


    18 20:33


 


 


  (Tylenol)  650 mg  Q4H  PRN


 PO  18 20:30


     


 


 


  (Zofran Inj)  4 mg  Q6H  PRN


 IVP  18 20:30


     


 


 


  (Narcan Inj)  0.4 mg  UNSCH  PRN


 IV PUSH  18 20:30


     


 


 


  (Milk Of


 Magnesia Liq)  30 ml  Q12H  PRN


 PO  18 20:30


     


 


 


  (Senokot)  17.2 mg  Q12H  PRN


 PO  18 20:30


     


 


 


  (Dulcolax Supp)  10 mg  DAILY  PRN


 RECTAL  18 20:30


     


 


 


  (Lactulose Liq)  30 ml  DAILY  PRN


 PO  18 20:30


     


 








Family History


Dad: CVA myocardial infarction 


Mom: Coronary artery disease


Social History


Patient denies any tobacco, alcohol or illicit drug use





Physical Exam


Vital Signs





Vital Signs








  Date Time  Temp Pulse Resp B/P (MAP) Pulse Ox O2 Delivery O2 Flow Rate FiO2


 


18 22:05     96   


 


18 21:22 98.0 57 20 175/84 (114) 96 Room Air  


 


18 21:02   20     


 


18 21:01   20     


 


18 20:06  66  183/90 (121)    


 


18 19:11   20     


 


18 18:14  57  198/95 (129)    


 


18 17:13 98.1 62 14 198/93 (128) 99 Room Air  








Physical Exam


GENERAL: This is a well-nourished, well-developed patient, in no apparent 

distress, appears comfortable s/p morphine


SKIN: No rashes, ecchymoses or lesions. Cool and dry.


HEAD: Atraumatic. Normocephalic. No temporal or scalp tenderness.


EYES: Pupils equal round and reactive. Extraocular motions intact.


CARDIOVASCULAR: Regular rate and rhythm without murmurs, gallops, or rubs. 


RESPIRATORY: Clear to auscultation. Breath sounds equal bilaterally. No wheezes

, rales, or rhonchi.  


GASTROINTESTINAL: Abdomen soft, non-tender, nondistended. 


MUSCULOSKELETAL: Left shoulder tenderness, no calf tenderness


NEUROLOGICAL: Awake and alert.   Motor and sensory grossly within normal 

limits.  Normal speech.


Laboratory





Laboratory Tests








Test


  18


16:55 18


21:30


 


White Blood Count 7.5  


 


Red Blood Count 4.57  


 


Hemoglobin 13.2  


 


Hematocrit 39.4  


 


Mean Corpuscular Volume 86.1  


 


Mean Corpuscular Hemoglobin 29.0  


 


Mean Corpuscular Hemoglobin


Concent 33.6 


  


 


 


Red Cell Distribution Width 16.9  


 


Platelet Count 157  


 


Mean Platelet Volume 8.8  


 


Neutrophils (%) (Auto) 94.5  


 


Lymphocytes (%) (Auto) 2.3  


 


Monocytes (%) (Auto) 2.9  


 


Eosinophils (%) (Auto) 0.0  


 


Basophils (%) (Auto) 0.3  


 


Neutrophils # (Auto) 7.1  


 


Lymphocytes # (Auto) 0.2  


 


Monocytes # (Auto) 0.2  


 


Eosinophils # (Auto) 0.0  


 


Basophils # (Auto) 0.0  


 


CBC Comment DIFF FINAL  


 


Differential Comment   


 


Prothrombin Time 10.3  


 


Prothromb Time International


Ratio 1.0 


  


 


 


Activated Partial


Thromboplast Time 21.3 


  


 


 


Blood Urea Nitrogen 13  


 


Creatinine 0.35  


 


Random Glucose 127  


 


Total Protein 7.3  


 


Albumin 2.4  


 


Calcium Level 8.3  


 


Magnesium Level 1.7  


 


Alkaline Phosphatase 62  


 


Aspartate Amino Transf


(AST/SGOT) 33 


  


 


 


Alanine Aminotransferase


(ALT/SGPT) 41 


  


 


 


Total Bilirubin 0.7  


 


Sodium Level 136  


 


Potassium Level 3.9  


 


Chloride Level 101  


 


Carbon Dioxide Level 25.3  


 


Anion Gap 10  


 


Estimat Glomerular Filtration


Rate 188 


  


 


 


Total Creatine Kinase 27  


 


Troponin I LESS THAN 0.02  








Result Diagram:  


18 1655                                                                   

             18 1655





Imaging





Last Impressions








Chest X-Ray 18 1629 Signed





Impressions: 





 CONCLUSION: No acute intrathoracic disease.


 


Head CT 18 1623 Signed





Impressions: 





 CONCLUSION:











Caprini VTE Risk Assessment


Caprini VTE Risk Assessment:  No/Low Risk (score <= 1)


Caprini Risk Assessment Model











 Point Value = 1          Point Value = 2  Point Value = 3  Point Value = 5


 


Age 41-60


Minor surgery


BMI > 25 kg/m2


Swollen legs


Varicose veins


Pregnancy or postpartum


History of unexplained or recurrent


   spontaneous 


Oral contraceptives or hormone


   replacement


Sepsis (< 1 month)


Serious lung disease, including


   pneumonia (< 1 month)


Abnormal pulmonary function


Acute myocardial infarction


Congestive heart failure (< 1 month)


History of inflammatory bowel disease


Medical patient at bed rest Age 61-74


Arthroscopic surgery


Major open surgery (> 45 min)


Laparoscopic surgery (> 45 min)


Malignancy


Confined to bed (> 72 hours)


Immobilizing plaster cast


Central venous access Age >= 75


History of VTE


Family history of VTE


Factor V Leiden


Prothrombin 42187F


Lupus anticoagulant


Anticardiolipin antibodies


Elevated serum homocysteine


Heparin-induced thrombocytopenia


Other congenital or acquired


   thrombophilia Stroke (< 1 month)


Elective arthroplasty


Hip, pelvis, or leg fracture


Acute spinal cord injury (< 1 month)








Prophylaxis Regimen











   Total Risk


Factor Score Risk Level Prophylaxis Regimen


 


0-1      Low Early ambulation


 


2 Moderate Order ONE of the following:


*Sequential Compression Device (SCD)


*Heparin 5000 units SQ BID


 


3-4 Higher Order ONE of the following medications:


*Heparin 5000 units SQ TID


*Enoxaparin/Lovenox 40 mg SQ daily (WT < 150 kg, CrCl > 30 mL/min)


*Enoxaparin/Lovenox 30 mg SQ daily (WT < 150 kg, CrCl > 10-29 mL/min)


*Enoxaparin/Lovenox 30 mg SQ BID (WT < 150 kg, CrCl > 30 mL/min)


AND/OR


*Sequential Compression Device (SCD)


 


5 or more Highest Order ONE of the following medications:


*Heparin 5000 units SQ TID (Preferred with Epidurals)


*Enoxaparin/Lovenox 40 mg SQ daily (WT < 150 kg, CrCl > 30 mL/min)


*Enoxaparin/Lovenox 30 mg SQ daily (WT < 150 kg, CrCl > 10-29 mL/min)


*Enoxaparin/Lovenox 30 mg SQ BID (WT < 150 kg, CrCl > 30 mL/min)


AND


*Sequential Compression Device (SCD)











Assessment and Plan


Assessment and Plan


61 y/o female with a history of lupus, HTN, HLD, hypothyroidism and DM 

presented to the ED with complaints of chest and shoulder pain. 





Chest pain, atypical, suspect muscle in nature, r/o ACS


Troponin .02, EKG reviewed and shows SR no ST elevation


   -Serial troponin and EKG


   -Pain management with IV morphine and PO tramadol 





Left shoulder pain


   -Pain management as above


   -Shoulder x ray ordered





HTN, chronic, currently elevated possibly due to pain


   -Resume home medications losartan and metoprolol


   -Vasotec IV prn





HLD, chronic


   -Resume home medications lovastatin 





Lupus, chronic, patient was just restarted on a prednisone taper on Saturday


   -Cont prednisone taper, Prednisone 20mg BID for 3 more days and then Taper 

10mg tabs..1 pill BID days 1-2, Days 3-4 1 pill daily, days 5-8 1/2 pill daily 

then stop





DM, chronic


   -Accu checks with SSI


   -Diabetic diet





DVT prophylaxis: SCDs


Discussed Condition With


Patient, patients family and RN











Elizabeth Salgado May 22, 2018 22:38

## 2018-05-22 NOTE — RADRPT
EXAM DATE:  5/22/2018 6:59 PM EDT

AGE/SEX:        62 years / Female



INDICATIONS:  Cephalgia.



CLINICAL DATA:  This is the patient's initial encounter. Patient reports that signs and symptoms have
 been present for 1 day and indicates a pain score of 10/10. 

                                                                          

MEDICAL/SURGICAL HISTORY:   Hypertension.  Lupus.  Rheumatoid arthritis.  Diabetes. Anemia.   None.



RADIATION DOSE:  34.11 CTDI (mGy)







COMPARISON:      No prior [facility] exams available for comparison.





TECHNIQUE:  CT of the head without contrast.  Using automated exposure control and adjustment of the 
mA and/or kV according to patient size, radiation dose was kept as low as reasonably achievable to ob
tain optimal diagnostic quality images.



FINDINGS: 

Cerebrum:  The ventricles are normal for age.  No evidence of midline shift, mass lesion, hemorrhage 
or acute infarction.  No extraaxial fluid collections are seen.

Posterior Fossa:  The cerebellum and brainstem are intact.  The 4th ventricle is midline.  The cerebe
llopontine angle is unremarkable.

Extracranial:  The visualized portion of the orbits is intact.

Skull:  The calvaria is intact.  No evidence of skull fracture.

Post Contrast:  No abnormal areas of parenchymal or dural enhancement.  No evidence of blood-brain ba
rrier breakdown.



CONCLUSION:

Negative CT examination.



Electronically signed by: Edu Hill MD  5/22/2018 7:09 PM EDT

## 2018-05-23 VITALS
RESPIRATION RATE: 18 BRPM | OXYGEN SATURATION: 99 % | TEMPERATURE: 97.6 F | SYSTOLIC BLOOD PRESSURE: 163 MMHG | DIASTOLIC BLOOD PRESSURE: 90 MMHG | HEART RATE: 69 BPM

## 2018-05-23 VITALS
DIASTOLIC BLOOD PRESSURE: 92 MMHG | HEART RATE: 82 BPM | SYSTOLIC BLOOD PRESSURE: 180 MMHG | OXYGEN SATURATION: 100 % | TEMPERATURE: 97.4 F | RESPIRATION RATE: 18 BRPM

## 2018-05-23 VITALS
RESPIRATION RATE: 18 BRPM | TEMPERATURE: 97.8 F | HEART RATE: 62 BPM | SYSTOLIC BLOOD PRESSURE: 165 MMHG | DIASTOLIC BLOOD PRESSURE: 82 MMHG | OXYGEN SATURATION: 99 %

## 2018-05-23 VITALS
SYSTOLIC BLOOD PRESSURE: 166 MMHG | RESPIRATION RATE: 18 BRPM | DIASTOLIC BLOOD PRESSURE: 83 MMHG | HEART RATE: 78 BPM | OXYGEN SATURATION: 98 % | TEMPERATURE: 98.4 F

## 2018-05-23 VITALS — HEART RATE: 61 BPM

## 2018-05-23 VITALS — HEART RATE: 65 BPM

## 2018-05-23 RX ADMIN — Medication SCH ML: at 09:18

## 2018-05-23 RX ADMIN — INSULIN ASPART SCH: 100 INJECTION, SOLUTION INTRAVENOUS; SUBCUTANEOUS at 17:00

## 2018-05-23 RX ADMIN — INSULIN ASPART SCH: 100 INJECTION, SOLUTION INTRAVENOUS; SUBCUTANEOUS at 07:56

## 2018-05-23 RX ADMIN — ONDANSETRON PRN MG: 4 TABLET, ORALLY DISINTEGRATING ORAL at 04:03

## 2018-05-23 RX ADMIN — INSULIN ASPART SCH: 100 INJECTION, SOLUTION INTRAVENOUS; SUBCUTANEOUS at 12:00

## 2018-05-23 RX ADMIN — MORPHINE SULFATE PRN MG: 2 INJECTION, SOLUTION INTRAMUSCULAR; INTRAVENOUS at 02:19

## 2018-05-23 RX ADMIN — ONDANSETRON PRN MG: 4 TABLET, ORALLY DISINTEGRATING ORAL at 09:19

## 2018-05-23 NOTE — HHI.DCPOC
Discharge Care Plan


Diagnosis:  


(1) Chest pain








Your Health Problems Are: Difficulty with ADL





 Exercise Tolerance








Goals to Promote Your Health


* To prevent worsening of your condition and complications


* To maintain your health at the optimal level


Directions to Meet Your Goals


*** Take your medications as prescribed


*** Follow your dietary instruction


*** Follow activity as directed








*** Keep your appointments as scheduled


*** Take your immunizations and boosters as scheduled


*** If your symptoms worsen call your PCP, if no PCP go to Urgent Care Center 

or Emergency Room***


*** Smoking is Dangerous to Your Health. Avoid second hand smoke***


***Call the 24-hour hour crisis hotline for domestic abuse at 1-599.394.9259***











Kavin Barksdale MD May 23, 2018 09:13

## 2018-05-23 NOTE — EKG
Date Performed: 05/23/2018       Time Performed: 02:27:24

 

PTAGE:      62 years

 

EKG:      Sinus rhythm 

 

 LOW QRS VOLTAGE IN PRECORDIAL LEADS POSSIBLE ANTERIOR MYOCARDIAL INFARCTION BORDERLINE ECG No signif
icant change from prior electrocardiogram. 

 

 PREVIOUS TRACING            : 05/22/2018 17.27

 

DOCTOR:   Moy Hernandez  Interpretating Date/Time  05/23/2018 14:38:54

## 2018-05-23 NOTE — RADRPT
EXAM DATE:  5/23/2018 4:22 PM EDT

AGE/SEX:        62 years / Female



INDICATIONS:Angina. . Chest pain.   

 

CLINICAL DATA:  This is the patient's initial encounter. Patient reports that signs and symptoms have
 been present for 1 day and indicates a pain score of 0/10. 

                                                                          

MEDICAL/SURGICAL HISTORY:       Diabetes mellitus type II.  Hypertension.  Lupus. Appendectomy.  Hyst
erectomy.



COMPARISON:      No prior [facility] exams available for comparison. No external comparison.

 

DOSE:  8.5 mCi Tc 99m Myoview at rest

              27.1  mCi Tc99m-Myoview at stress

              0.4 mg Lexiscan



STRESS SYMPTOMS: Nausea and vomiting.



MEDICATION: 60 mg caffeine IV



EJECTION FRACTION:  52 %



TECHNIQUE:  The patient underwent pharmacologic stress with infusion of prescribed dose.  Continuous 
ECG tracing was monitored during stress.  Gated SPECT imaging was performed after stress and conventi
onal SPECT imaging was performed at rest.  The examination was performed on a SPECT/CT scanner, both 
attenuation and non-corrected datasets were reviewed.



FINDINGS:  

Distribution:  The maximum perfused segment at stress is in the anterior wall.

Perfusion Study:   The pattern of perfusion at stress is within normal limits.   

Gated Study:  There are intact wall motion and wall thickening without hypokinetic or dyskinetic segm
ents.  The ejection fraction is calculated at 52%.  



RISK CATEGORY:  Low (<1% Annual Motality Rate)



CONCLUSION: 

No areas of ischemia are seen.



Electronically signed by: Edu Hill MD  5/23/2018 4:25 PM EDT

## 2018-05-23 NOTE — HHI.PR
Subjective


Remarks


F/u chest and shoulder pain.  States she was fixing her bed when she developed 

chest pain.  She thinks she may have pulled her left shoulder.  No numbness.  

No history of CAD and stress test.  She has abnormal EKG with ST changes in the 

anterior leads.





Objective


Vitals





Vital Signs








  Date Time  Temp Pulse Resp B/P (MAP) Pulse Ox O2 Delivery O2 Flow Rate FiO2


 


5/23/18 07:52 97.6 69 18 163/90 (114) 99   


 


5/23/18 03:16  61      


 


5/23/18 02:07 97.8 62 18 165/82 (109) 99   


 


5/22/18 23:04        


 


5/22/18 22:05     96   


 


5/22/18 21:22 98.0 57 20 175/84 (114) 96 Room Air  


 


5/22/18 21:02   20     


 


5/22/18 21:01   20     


 


5/22/18 20:06  66  183/90 (121)    


 


5/22/18 19:11   20     


 


5/22/18 18:14  57  198/95 (129)    


 


5/22/18 17:13 98.1 62 14 198/93 (128) 99 Room Air  














I/O      


 


 5/22/18 5/22/18 5/22/18 5/23/18 5/23/18 5/23/18





 07:00 15:00 23:00 07:00 15:00 23:00


 


Intake Total    240 ml  


 


Balance    240 ml  


 


      


 


Intake Oral    240 ml  


 


# Voids    1  








Result Diagram:  


5/22/18 1655                                                                   

             5/22/18 1655





Imaging





Last Impressions








Chest X-Ray 5/22/18 1629 Signed





Impressions: 





 CONCLUSION: No acute intrathoracic disease.


 


Head CT 5/22/18 1623 Signed





Impressions: 





 CONCLUSION:


 


Shoulder X-Ray 5/22/18 0000 Signed





Impressions: 





 CONCLUSION: 








Objective Remarks


GENERAL: This is a well-nourished, well-developed patient, in no apparent 

distress


SKIN: No rashes, ecchymoses or lesions. Cool and dry.


CARDIOVASCULAR: Regular rate and rhythm without murmurs, gallops, or rubs. 


RESPIRATORY: Clear to auscultation. Breath sounds equal bilaterally. No wheezes

, rales, or rhonchi.  


GASTROINTESTINAL: Abdomen soft, non-tender, nondistended. 


MUSCULOSKELETAL: Left shoulder tenderness, no calf tenderness.  Full range of 

movement


NEUROLOGICAL: Awake and alert.   Motor and sensory grossly within normal 

limits.  Normal speech.





A/P


Problem List:  


(1) Chest pain


ICD Code:  R07.9 - Chest pain, unspecified


Assessment and Plan


63 y/o female with a history of lupus, HTN, HLD, hypothyroidism and DM 

presented to the ED with complaints of chest and shoulder pain. 





Exertional chest pain currently resolved.  Patient ruled out for MI.  Continue 

aspirin and beta-blocker will proceed with stress test in light of risk factors 

and abnormal EKG. 





Left shoulder pain.  Negative x-ray.  Pain management and consult physical 

therapy





HTN, chronic, currently elevated possibly due to pain


   -Resume home medications losartan and metoprolol, will increase losartan 200 

mg daily


   -Vasotec IV prn





HLD, chronic


   -Resume home medications lovastatin 





Lupus, chronic, patient was just restarted on a prednisone taper on Saturday


   -Cont prednisone taper, Prednisone 20mg BID for 3 more days and then Taper 

10mg tabs..1 pill BID days 1-2, Days 3-4 1 pill daily, days 5-8 1/2 pill daily 

then stop





DM, chronic


   -Accu checks with SSI


   -Diabetic diet





DVT prophylaxis: SCDs


Discharge Planning


Discharge patient to home if negative stress test


Condition on discharge: Improved


Regular Diet as tolerated


Ad [place] activity no driving


Rx written: None


Follow-up with primary care physician











Kavin Barksdale MD May 23, 2018 09:07

## 2018-05-23 NOTE — RADRPT
EXAM DATE:  5/23/2018 12:49 AM EDT

AGE/SEX:        62 years / Female



INDICATIONS:  Left shoulder pain, no known trauma.



CLINICAL DATA:  This is the patient's initial encounter. Patient reports that signs and symptoms have
 been present for 1 day and indicates a pain score of 4/10. 

                                                                          

MEDICAL/SURGICAL HISTORY:       Diabetes mellitus type II.  Hypertension. None.



COMPARISON:      No prior [facility] exams available for comparison.



FINDINGS:  

Bony structures are intact and in normal alignment.  Joints are intact without dislocation or signifi
cant arthropathy.  Osseous density is normal.  Soft tissues are unremarkable.  No radiopaque foreign 
bodies seen.



CONCLUSION: 

Negative exam.



 







Electronically signed by: Garrett Beard MD  5/23/2018 12:53 AM EDT

## 2018-05-24 ENCOUNTER — HOSPITAL ENCOUNTER (INPATIENT)
Dept: HOSPITAL 17 - NEPC | Age: 63
LOS: 4 days | Discharge: ANOTHER HOSPITAL | DRG: 91 | End: 2018-05-28
Attending: HOSPITALIST | Admitting: HOSPITALIST
Payer: COMMERCIAL

## 2018-05-24 VITALS
HEART RATE: 71 BPM | RESPIRATION RATE: 17 BRPM | DIASTOLIC BLOOD PRESSURE: 87 MMHG | SYSTOLIC BLOOD PRESSURE: 175 MMHG | TEMPERATURE: 98.7 F | OXYGEN SATURATION: 100 %

## 2018-05-24 VITALS
HEART RATE: 71 BPM | DIASTOLIC BLOOD PRESSURE: 94 MMHG | RESPIRATION RATE: 16 BRPM | OXYGEN SATURATION: 99 % | SYSTOLIC BLOOD PRESSURE: 193 MMHG | TEMPERATURE: 98.7 F

## 2018-05-24 VITALS — BODY MASS INDEX: 17.74 KG/M2 | WEIGHT: 82.23 LBS | HEIGHT: 57 IN

## 2018-05-24 VITALS
OXYGEN SATURATION: 99 % | RESPIRATION RATE: 18 BRPM | DIASTOLIC BLOOD PRESSURE: 96 MMHG | HEART RATE: 81 BPM | SYSTOLIC BLOOD PRESSURE: 191 MMHG

## 2018-05-24 VITALS
SYSTOLIC BLOOD PRESSURE: 185 MMHG | HEART RATE: 70 BPM | OXYGEN SATURATION: 99 % | TEMPERATURE: 97.3 F | DIASTOLIC BLOOD PRESSURE: 101 MMHG | RESPIRATION RATE: 24 BRPM

## 2018-05-24 VITALS
DIASTOLIC BLOOD PRESSURE: 90 MMHG | OXYGEN SATURATION: 99 % | HEART RATE: 64 BPM | RESPIRATION RATE: 18 BRPM | SYSTOLIC BLOOD PRESSURE: 166 MMHG

## 2018-05-24 DIAGNOSIS — R53.1: ICD-10-CM

## 2018-05-24 DIAGNOSIS — R10.9: ICD-10-CM

## 2018-05-24 DIAGNOSIS — R90.82: ICD-10-CM

## 2018-05-24 DIAGNOSIS — E78.5: ICD-10-CM

## 2018-05-24 DIAGNOSIS — H02.402: ICD-10-CM

## 2018-05-24 DIAGNOSIS — R63.0: ICD-10-CM

## 2018-05-24 DIAGNOSIS — G89.29: ICD-10-CM

## 2018-05-24 DIAGNOSIS — I10: ICD-10-CM

## 2018-05-24 DIAGNOSIS — I61.5: ICD-10-CM

## 2018-05-24 DIAGNOSIS — R60.9: ICD-10-CM

## 2018-05-24 DIAGNOSIS — M54.6: ICD-10-CM

## 2018-05-24 DIAGNOSIS — E07.9: ICD-10-CM

## 2018-05-24 DIAGNOSIS — M06.9: ICD-10-CM

## 2018-05-24 DIAGNOSIS — E83.51: ICD-10-CM

## 2018-05-24 DIAGNOSIS — R63.4: ICD-10-CM

## 2018-05-24 DIAGNOSIS — E03.9: ICD-10-CM

## 2018-05-24 DIAGNOSIS — Z82.49: ICD-10-CM

## 2018-05-24 DIAGNOSIS — H11.32: ICD-10-CM

## 2018-05-24 DIAGNOSIS — E86.0: ICD-10-CM

## 2018-05-24 DIAGNOSIS — E87.1: ICD-10-CM

## 2018-05-24 DIAGNOSIS — D64.9: ICD-10-CM

## 2018-05-24 DIAGNOSIS — R11.2: ICD-10-CM

## 2018-05-24 DIAGNOSIS — E78.00: ICD-10-CM

## 2018-05-24 DIAGNOSIS — G95.19: Primary | ICD-10-CM

## 2018-05-24 DIAGNOSIS — R62.7: ICD-10-CM

## 2018-05-24 DIAGNOSIS — Z79.84: ICD-10-CM

## 2018-05-24 DIAGNOSIS — E11.9: ICD-10-CM

## 2018-05-24 DIAGNOSIS — Z82.3: ICD-10-CM

## 2018-05-24 DIAGNOSIS — R29.810: ICD-10-CM

## 2018-05-24 DIAGNOSIS — M54.5: ICD-10-CM

## 2018-05-24 DIAGNOSIS — I60.9: ICD-10-CM

## 2018-05-24 LAB
ALBUMIN SERPL-MCNC: 2.5 GM/DL (ref 3.4–5)
ALP SERPL-CCNC: 64 U/L (ref 45–117)
ALT SERPL-CCNC: 43 U/L (ref 10–53)
AST SERPL-CCNC: 28 U/L (ref 15–37)
BASOPHILS # BLD AUTO: 0 TH/MM3 (ref 0–0.2)
BASOPHILS NFR BLD: 0.2 % (ref 0–2)
BILIRUB SERPL-MCNC: 0.8 MG/DL (ref 0.2–1)
BUN SERPL-MCNC: 11 MG/DL (ref 7–18)
CALCIUM SERPL-MCNC: 8 MG/DL (ref 8.5–10.1)
CHLORIDE SERPL-SCNC: 86 MEQ/L (ref 98–107)
CREAT SERPL-MCNC: 0.37 MG/DL (ref 0.5–1)
EOSINOPHIL # BLD: 0 TH/MM3 (ref 0–0.4)
EOSINOPHIL NFR BLD: 0 % (ref 0–4)
ERYTHROCYTE [DISTWIDTH] IN BLOOD BY AUTOMATED COUNT: 16.4 % (ref 11.6–17.2)
GFR SERPLBLD BASED ON 1.73 SQ M-ARVRAT: 176 ML/MIN (ref 89–?)
GLUCOSE SERPL-MCNC: 147 MG/DL (ref 74–106)
HCO3 BLD-SCNC: 28.7 MEQ/L (ref 21–32)
HCT VFR BLD CALC: 41.7 % (ref 35–46)
HGB BLD-MCNC: 14.2 GM/DL (ref 11.6–15.3)
LYMPHOCYTES # BLD AUTO: 0.2 TH/MM3 (ref 1–4.8)
LYMPHOCYTES NFR BLD AUTO: 2.9 % (ref 9–44)
MCH RBC QN AUTO: 28.8 PG (ref 27–34)
MCHC RBC AUTO-ENTMCNC: 34.1 % (ref 32–36)
MCV RBC AUTO: 84.6 FL (ref 80–100)
MONOCYTE #: 0.4 TH/MM3 (ref 0–0.9)
MONOCYTES NFR BLD: 6.1 % (ref 0–8)
NEUTROPHILS # BLD AUTO: 6.3 TH/MM3 (ref 1.8–7.7)
NEUTROPHILS NFR BLD AUTO: 90.8 % (ref 16–70)
PLATELET # BLD: 106 TH/MM3 (ref 150–450)
PMV BLD AUTO: 9.1 FL (ref 7–11)
PROT SERPL-MCNC: 7.1 GM/DL (ref 6.4–8.2)
RBC # BLD AUTO: 4.93 MIL/MM3 (ref 4–5.3)
SODIUM SERPL-SCNC: 125 MEQ/L (ref 136–145)
WBC # BLD AUTO: 7 TH/MM3 (ref 4–11)

## 2018-05-24 PROCEDURE — 74174 CTA ABD&PLVS W/CONTRAST: CPT

## 2018-05-24 PROCEDURE — 82746 ASSAY OF FOLIC ACID SERUM: CPT

## 2018-05-24 PROCEDURE — 84484 ASSAY OF TROPONIN QUANT: CPT

## 2018-05-24 PROCEDURE — 86039 ANTINUCLEAR ANTIBODIES (ANA): CPT

## 2018-05-24 PROCEDURE — 85730 THROMBOPLASTIN TIME PARTIAL: CPT

## 2018-05-24 PROCEDURE — 85245 CLOT FACTOR VIII VW RISTOCTN: CPT

## 2018-05-24 PROCEDURE — 85247 CLOT FACTOR VIII MULTIMETRIC: CPT

## 2018-05-24 PROCEDURE — 80048 BASIC METABOLIC PNL TOTAL CA: CPT

## 2018-05-24 PROCEDURE — 85670 THROMBIN TIME PLASMA: CPT

## 2018-05-24 PROCEDURE — 82550 ASSAY OF CK (CPK): CPT

## 2018-05-24 PROCEDURE — 70551 MRI BRAIN STEM W/O DYE: CPT

## 2018-05-24 PROCEDURE — 82948 REAGENT STRIP/BLOOD GLUCOSE: CPT

## 2018-05-24 PROCEDURE — 72156 MRI NECK SPINE W/O & W/DYE: CPT

## 2018-05-24 PROCEDURE — 85246 CLOT FACTOR VIII VW ANTIGEN: CPT

## 2018-05-24 PROCEDURE — 81001 URINALYSIS AUTO W/SCOPE: CPT

## 2018-05-24 PROCEDURE — A9579 GAD-BASE MR CONTRAST NOS,1ML: HCPCS

## 2018-05-24 PROCEDURE — 85652 RBC SED RATE AUTOMATED: CPT

## 2018-05-24 PROCEDURE — 84443 ASSAY THYROID STIM HORMONE: CPT

## 2018-05-24 PROCEDURE — 74018 RADEX ABDOMEN 1 VIEW: CPT

## 2018-05-24 PROCEDURE — 86430 RHEUMATOID FACTOR TEST QUAL: CPT

## 2018-05-24 PROCEDURE — 86592 SYPHILIS TEST NON-TREP QUAL: CPT

## 2018-05-24 PROCEDURE — 70498 CT ANGIOGRAPHY NECK: CPT

## 2018-05-24 PROCEDURE — 83921 ORGANIC ACID SINGLE QUANT: CPT

## 2018-05-24 PROCEDURE — 76775 US EXAM ABDO BACK WALL LIM: CPT

## 2018-05-24 PROCEDURE — 82533 TOTAL CORTISOL: CPT

## 2018-05-24 PROCEDURE — 84439 ASSAY OF FREE THYROXINE: CPT

## 2018-05-24 PROCEDURE — 86038 ANTINUCLEAR ANTIBODIES: CPT

## 2018-05-24 PROCEDURE — 80053 COMPREHEN METABOLIC PANEL: CPT

## 2018-05-24 PROCEDURE — 83735 ASSAY OF MAGNESIUM: CPT

## 2018-05-24 PROCEDURE — 82306 VITAMIN D 25 HYDROXY: CPT

## 2018-05-24 PROCEDURE — 84155 ASSAY OF PROTEIN SERUM: CPT

## 2018-05-24 PROCEDURE — C9113 INJ PANTOPRAZOLE SODIUM, VIA: HCPCS

## 2018-05-24 PROCEDURE — 71275 CT ANGIOGRAPHY CHEST: CPT

## 2018-05-24 PROCEDURE — 70450 CT HEAD/BRAIN W/O DYE: CPT

## 2018-05-24 PROCEDURE — 85240 CLOT FACTOR VIII AHG 1 STAGE: CPT

## 2018-05-24 PROCEDURE — 84165 PROTEIN E-PHORESIS SERUM: CPT

## 2018-05-24 PROCEDURE — 85610 PROTHROMBIN TIME: CPT

## 2018-05-24 PROCEDURE — 84425 ASSAY OF VITAMIN B-1: CPT

## 2018-05-24 PROCEDURE — 86140 C-REACTIVE PROTEIN: CPT

## 2018-05-24 PROCEDURE — 70553 MRI BRAIN STEM W/O & W/DYE: CPT

## 2018-05-24 PROCEDURE — 85025 COMPLETE CBC W/AUTO DIFF WBC: CPT

## 2018-05-24 PROCEDURE — 70496 CT ANGIOGRAPHY HEAD: CPT

## 2018-05-24 PROCEDURE — 80307 DRUG TEST PRSMV CHEM ANLYZR: CPT

## 2018-05-24 PROCEDURE — 82330 ASSAY OF CALCIUM: CPT

## 2018-05-24 RX ADMIN — ENALAPRILAT PRN MG: 1.25 INJECTION INTRAVENOUS at 21:32

## 2018-05-24 RX ADMIN — HYDROCORTISONE SODIUM SUCCINATE SCH MG: 100 INJECTION, POWDER, FOR SOLUTION INTRAMUSCULAR; INTRAVENOUS at 23:12

## 2018-05-24 RX ADMIN — ONDANSETRON PRN MG: 4 TABLET, ORALLY DISINTEGRATING ORAL at 21:32

## 2018-05-24 RX ADMIN — Medication SCH ML: at 21:00

## 2018-05-24 RX ADMIN — HYDROCORTISONE SODIUM SUCCINATE SCH MG: 100 INJECTION, POWDER, FOR SOLUTION INTRAMUSCULAR; INTRAVENOUS at 22:00

## 2018-05-24 RX ADMIN — METOPROLOL TARTRATE SCH MG: 25 TABLET, FILM COATED ORAL at 21:32

## 2018-05-24 RX ADMIN — SODIUM CHLORIDE AND POTASSIUM CHLORIDE SCH MLS/HR: 9; 1.49 INJECTION, SOLUTION INTRAVENOUS at 23:12

## 2018-05-24 NOTE — HHI.HP
__________________________________________________





HPI


[facility]


[facility] Physician


Non-Staff


Admission Diagnosis





Hyponatremia, nausea and vomiting


Diagnoses:  


Chief Complaint:  


Nausea,vomiting, weakness


Travel History


International Travel<30 Days:  No


Contact w/Intl Traveler <30 Da:  No


Traveled to Known Affected Are:  No


History of Present Illness


62-year-old female with a history of arthritis, lupus, diabetes, hypothyroidism

, hypertension presents to the ED for evaluation of generalized weakness.  

Patient was recently discharged yesterday after a hospital stay for chest pain 

and shoulder pain.  Patient was cleared from a cardiology standpoint and sent 

home.  According to family she has not been able to eat or keep anything down 

she has been nauseated and vomiting since she has been released from the 

hospital.  She is also complaining of 8/10 back pain, intermittent, throbbing, 

with associated nausea.  Worse with movement but has been better with pain 

medication.  Family also states patient has not had a bowel movement since 

Monday.  Family is concerned because patient has been declining healthwise 

since November and lack of appetite.  Upon examination patient is laying in bed 

dry heaving complaining of back pain and asking for pain medication.  Family is 

afraid to take her home again because they are concerned that she will fall due 

to her weakness.  She denies any associated chest pain, shortness of breath, 

fever or chills.





Review of Systems


Except as stated in HPI:  all other systems reviewed are Neg





Past Family Social History


Past Medical History


Hypothyroidism


Hyperlipidemia


Hypertension


Lupus/RA


Diabetes type II


Chronic anemia


Past Surgical History


Hysterectomy


Bladder sling


Appendectomy


Reported Medications





Reported Meds & Active Scripts


Active


Cozaar (Losartan Potassium) 50 Mg Tab 100 Mg PO DAILY


Prednisone 10 Mg Tab 10 Mg PO DAILY


     Use this only IF Bolus and Taper of prednisone fails.


Prednisone 20 Mg Tab 20 Mg PO BID


     Use this treatment first, then use taper treatment.


Prednisone 10 Mg Tab 10 Mg PO AS DIRECTED


     Taper Treatment:


     1 pill twice a day for Day 1-2


     1 pill once a day for Day 3-4


     1/2 pill once a day for Day 5-8


     Then stop


Nystatin-Triamcinolone 100,000-0.1 Unit/Gm Oint 1 Applic TOPICAL Q12HR


Tramadol (Tramadol HCl) 50 Mg Tab 50 Mg PO Q8H PRN


Reported


Levothyroxine (Levothyroxine Sodium) 50 Mcg Tab 50 Mcg PO DAILY


Metformin (Metformin HCl) 500 Mg Tab 500 Mg PO BIDPC


Aspirin Low Dose (Aspirin) 81 Mg Chew 81 Mg CHEW DAILY


Lovastatin 20 Mg Tab 20 Mg PO DAILY


Metoprolol Tartrate 25 Mg Tab 25 Mg PO BID


Losartan (Losartan Potassium) 50 Mg Tab 50 Mg PO DAILY


Allergies:  


Coded Allergies:  


     codeine (Verified  Allergy, Intermediate, 18)


     nitroglycerin (Verified  Adverse Reaction, Severe, Nausea/Vomiting, 18

)


Active Ordered Medications





Current Medications








 Medications


  (Trade)  Dose


 Ordered  Sig/Nhi


 Route  Start Time


 Stop Time Status Last Admin


 


  (Zofran  Odt)  4 mg  Q4H  PRN


 PO  18 17:45


    18 21:32


 


 


  (NS Flush)  2 ml  UNSCH  PRN


 IV FLUSH  18 17:45


     


 


 


  (NS Flush)  2 ml  BID


 IV FLUSH  18 21:00


     


 


 


  (Compazine Supp)  25 mg  Q12H  PRN


 RECTAL  18 17:45


     


 


 


  (Narcan Inj)  0.4 mg  UNSCH  PRN


 IV PUSH  18 17:45


     


 


 


  (Milk Of


 Magnesia Liq)  30 ml  Q12H  PRN


 PO  18 17:45


     


 


 


  (Senokot)  17.2 mg  Q12H  PRN


 PO  18 17:45


     


 


 


  (Dulcolax Supp)  10 mg  DAILY  PRN


 RECTAL  18 17:45


     


 


 


  (Lactulose Liq)  30 ml  DAILY  PRN


 PO  18 17:45


     


 


 


  (SoluCORTEF INJ)  50 mg  Q8HR


 IV PUSH  18 18:30


     


 


 


  (Vasotec Inj)  1.25 mg  Q6H  PRN


 IV PUSH  18 21:00


    18 21:32


 


 


 Potassium


 Chloride/Sodium


 Chloride  1,000 ml @ 


 100 mls/hr  Q10H


 IV  18 21:00


     


 


 


  (Aspirin Chew)  81 mg  DAILY


 CHEW  18 09:00


     


 


 


  (Synthroid)  50 mcg  DAILY@0600


 PO  18 06:00


     


 


 


  (Cozaar)  50 mg  DAILY


 PO  18 09:00


     


 


 


  (Pravachol)  20 mg  DAILY


 PO  18 09:00


     


 


 


  (Lopressor)  25 mg  BID


 PO  18 21:00


    18 21:32


 


 


  (Protonix Inj)  40 mg  Q12H


 IV PUSH  18 22:00


     


 


 


  (Vitamin D3)  1,000 units  DAILY


 PO  18 09:00


     


 








Family History


Dad: CVA myocardial infarction 


Mom: Coronary artery disease


Social History


Patient denies any tobacco, alcohol or illicit drug use





Physical Exam


Vital Signs





Vital Signs








  Date Time  Temp Pulse Resp B/P (MAP) Pulse Ox O2 Delivery O2 Flow Rate FiO2


 


18 20:02 98.7 71 16 193/94 (127) 99   


 


18 18:00  64 18 166/90 (115) 99 Room Air  


 


18 16:06  81 18 191/96 (127) 99 Room Air  


 


18 16:06   18     


 


18 16:02   18     


 


18 14:46 97.3 70 24 185/101 (129) 99   








Physical Exam


GENERAL: This is a well-nourished, well-developed patient, in no apparent 

distress, appears comfortable s/p morphine


SKIN: No rashes, ecchymoses or lesions. Cool and dry.


HEAD: Atraumatic. Normocephalic. No temporal or scalp tenderness.


EYES: Pupils equal round and reactive. Extraocular motions intact.


CARDIOVASCULAR: Regular rate and rhythm without murmurs, gallops, or rubs. 


RESPIRATORY: Clear to auscultation. Breath sounds equal bilaterally. No wheezes

, rales, or rhonchi.  


GASTROINTESTINAL: Abdomen soft, non-tender, nondistended.  Mild CVA tenderness


MUSCULOSKELETAL: Left shoulder tenderness, no calf tenderness


NEUROLOGICAL: Awake and alert.   Motor and sensory grossly within normal 

limits.  Normal speech.


Laboratory





Laboratory Tests








Test


  18


15:50 18


18:18


 


White Blood Count 7.0  


 


Red Blood Count 4.93  


 


Hemoglobin 14.2  


 


Hematocrit 41.7  


 


Mean Corpuscular Volume 84.6  


 


Mean Corpuscular Hemoglobin 28.8  


 


Mean Corpuscular Hemoglobin


Concent 34.1 


  


 


 


Red Cell Distribution Width 16.4  


 


Platelet Count 106  


 


Mean Platelet Volume 9.1  


 


Neutrophils (%) (Auto) 90.8  


 


Lymphocytes (%) (Auto) 2.9  


 


Monocytes (%) (Auto) 6.1  


 


Eosinophils (%) (Auto) 0.0  


 


Basophils (%) (Auto) 0.2  


 


Neutrophils # (Auto) 6.3  


 


Lymphocytes # (Auto) 0.2  


 


Monocytes # (Auto) 0.4  


 


Eosinophils # (Auto) 0.0  


 


Basophils # (Auto) 0.0  


 


CBC Comment DIFF FINAL  


 


Differential Comment   


 


Blood Urea Nitrogen 11  


 


Creatinine 0.37  


 


Random Glucose 147  


 


Total Protein 7.1  


 


Albumin 2.5  


 


Calcium Level 8.0  


 


Alkaline Phosphatase 64  


 


Aspartate Amino Transf


(AST/SGOT) 28 


  


 


 


Alanine Aminotransferase


(ALT/SGPT) 43 


  


 


 


Total Bilirubin 0.8  


 


Sodium Level 125  


 


Potassium Level 3.4  


 


Chloride Level 86  


 


Carbon Dioxide Level 28.7  


 


Anion Gap 10  


 


Estimat Glomerular Filtration


Rate 176 


  


 


 


Magnesium Level 1.7  


 


Thyroid Stimulating Hormone


3rd Gen 0.781 


  


 








Result Diagram:  


18 1550                                                                   

             18 1550





Imaging





Last Impressions








Abdomen X-Ray 18 0000 Signed





Impressions: 





 CONCLUSION:





 No acute findings. No dilated loops of small or large bowel.





  





 











Caprini VTE Risk Assessment


Caprini VTE Risk Assessment:  No/Low Risk (score <= 1)


Caprini Risk Assessment Model











 Point Value = 1          Point Value = 2  Point Value = 3  Point Value = 5


 


Age 41-60


Minor surgery


BMI > 25 kg/m2


Swollen legs


Varicose veins


Pregnancy or postpartum


History of unexplained or recurrent


   spontaneous 


Oral contraceptives or hormone


   replacement


Sepsis (< 1 month)


Serious lung disease, including


   pneumonia (< 1 month)


Abnormal pulmonary function


Acute myocardial infarction


Congestive heart failure (< 1 month)


History of inflammatory bowel disease


Medical patient at bed rest Age 61-74


Arthroscopic surgery


Major open surgery (> 45 min)


Laparoscopic surgery (> 45 min)


Malignancy


Confined to bed (> 72 hours)


Immobilizing plaster cast


Central venous access Age >= 75


History of VTE


Family history of VTE


Factor V Leiden


Prothrombin 57315C


Lupus anticoagulant


Anticardiolipin antibodies


Elevated serum homocysteine


Heparin-induced thrombocytopenia


Other congenital or acquired


   thrombophilia Stroke (< 1 month)


Elective arthroplasty


Hip, pelvis, or leg fracture


Acute spinal cord injury (< 1 month)








Prophylaxis Regimen











   Total Risk


Factor Score Risk Level Prophylaxis Regimen


 


0-1      Low Early ambulation


 


2 Moderate Order ONE of the following:


*Sequential Compression Device (SCD)


*Heparin 5000 units SQ BID


 


3-4 Higher Order ONE of the following medications:


*Heparin 5000 units SQ TID


*Enoxaparin/Lovenox 40 mg SQ daily (WT < 150 kg, CrCl > 30 mL/min)


*Enoxaparin/Lovenox 30 mg SQ daily (WT < 150 kg, CrCl > 10-29 mL/min)


*Enoxaparin/Lovenox 30 mg SQ BID (WT < 150 kg, CrCl > 30 mL/min)


AND/OR


*Sequential Compression Device (SCD)


 


5 or more Highest Order ONE of the following medications:


*Heparin 5000 units SQ TID (Preferred with Epidurals)


*Enoxaparin/Lovenox 40 mg SQ daily (WT < 150 kg, CrCl > 30 mL/min)


*Enoxaparin/Lovenox 30 mg SQ daily (WT < 150 kg, CrCl > 10-29 mL/min)


*Enoxaparin/Lovenox 30 mg SQ BID (WT < 150 kg, CrCl > 30 mL/min)


AND


*Sequential Compression Device (SCD)











Assessment and Plan


Assessment and Plan


62-year-old female with a history of arthritis, lupus, diabetes, hypothyroidism

, hypertension presents to the ED for evaluation of generalized weakness.





Failure to thrive, patient with lethargy, nausea, vomiting and weight loss


   -IVF for hydration NS 20K @100ml


   -B12, vitamin D levels ordered, vitamin D low at 26.4, 1000 mg vitamin D 

ordered daily


   -Anti-emetics as needed


   -Consult dietary for possible calorie count due to poor p.o. intake


   -Consult PT, patient may benefit from rehab





Abdominal pain, no BM for 3 days


Patient with a history of a CT abdomen that shows a left renal mass likely a 

hemorrhagic cyst, ultrasound was suggested


   -Abdominal x-ray ordered to rule out ileus


   -Toradol IV as needed


   -UA pending sample


   -Kidney ultrasound ordered for follow-up patient has weight loss, and lower 

back pain





Electrolyte imbalance


, K 3.4


   -Supplementation ordered, trend labs, replace as needed


   -Continue IVF





Lupus, chronic, patient was on a prednisone taper at home


Cortisol level 28.2


   -Hydrocortisone IV ordered, hold home prednisone


   -Protonix for GI prophylaxis





Hypertension, chronic, currently uncontrolled suspect due to pain and vomiting


   -Resume home medications metoprolol and losartan, Vasotec IV as needed


   -Monitor vitals





DVT prophylaxis: SCDs


Discussed Condition With


Patient and patient's family











Elizabeth Salgado May 24, 2018 20:22

## 2018-05-24 NOTE — PD
HPI


Chief Complaint:  GI Complaint


Time Seen by Provider:  15:25


Travel History


International Travel<30 days:  No


Contact w/Intl Traveler<30days:  No


Traveled to known affect area:  No





History of Present Illness


HPI


 this 62-year-old female with history of rheumatoid arthritis, lupus, diabetes, 

hypothyroidism, hypertension, presents with her  for evaluation.  For 

the past year she has had progressive generalized weakness and difficulty with 

performing activities of daily living.  The patient and her  moved to 

the [facility] from [place] 3 months ago and establish care with a 

new primary care physician in [place].  Over the past several days the patient 

has had worsening generalized weakness as well as generalized body pain and 

nausea and vomiting.  She has been seen here 4 times this month for evaluation 

of similar symptoms.  Most recently she was admitted on May 22 and discharged 

yesterday.  She presents today because she still has generalized weakness as 

well as nausea and vomiting.  She has had difficulty keeping any food, fluid or 

medications down secondary to the nausea and vomiting.  Focally she denies any 

chest pain or abdominal pain.  Primarily she is having generalized chronic back 

pain.  She denies fevers or chills, cough or congestion.  Per chart review she 

underwent CT the abdomen pelvis on May 9, chest x-ray, shoulder x-ray, head CT 

on May 22 and myocardial perfusion scan yesterday.  She was discharged with a 

order for home health care however home health care has not yet contacted the 

family.  She was also started on prednisone.





[facility]


Past Medical History


Anemia:  Yes


Arthritis:  Yes (RA)


Autoimmune Disease:  Yes (lupus)


Blood Disorders:  Yes (ANEMIA)


Heart Rhythm Problems:  Yes (MURMURS)


Cancer:  No


Cardiovascular Problems:  Yes


High Cholesterol:  Yes


Chest Pain:  No


Congestive Heart Failure:  No


Diabetes:  Yes


Endocrine:  Yes


Genitourinary:  No


Hypertension:  Yes


Immune Disorder:  Yes (LUPUS)


Musculoskeletal:  No


Neurologic:  No


Psychiatric:  No


Reproductive:  No


Respiratory:  No


Thyroid Disease:  Yes





Past Surgical History


Abdominal Surgery:  Yes (bladder sling)


Appendectomy:  Yes


Gynecologic Surgery:  Yes


Hysterectomy:  Yes





Social History


Alcohol Use:  No


Tobacco Use:  No


Substance Use:  No





Allergies-Medications


(Allergen,Severity, Reaction):  


Coded Allergies:  


     codeine (Verified  Allergy, Intermediate, 5/22/18)


     nitroglycerin (Verified  Adverse Reaction, Severe, Nausea/Vomiting, 5/23/18

)


Reported Meds & Prescriptions





Reported Meds & Active Scripts


Active


Cozaar (Losartan Potassium) 50 Mg Tab 100 Mg PO DAILY


Prednisone 10 Mg Tab 10 Mg PO DAILY


     Use this only IF Bolus and Taper of prednisone fails.


Prednisone 20 Mg Tab 20 Mg PO BID


     Use this treatment first, then use taper treatment.


Prednisone 10 Mg Tab 10 Mg PO AS DIRECTED


     Taper Treatment:


     1 pill twice a day for Day 1-2


     1 pill once a day for Day 3-4


     1/2 pill once a day for Day 5-8


     Then stop


Nystatin-Triamcinolone 100,000-0.1 Unit/Gm Oint 1 Applic TOPICAL Q12HR


Tramadol (Tramadol HCl) 50 Mg Tab 50 Mg PO Q8H PRN


Reported


Levothyroxine (Levothyroxine Sodium) 50 Mcg Tab 50 Mcg PO DAILY


Metformin (Metformin HCl) 500 Mg Tab 500 Mg PO BIDPC


Aspirin Low Dose (Aspirin) 81 Mg Chew 81 Mg CHEW DAILY


Lovastatin 20 Mg Tab 20 Mg PO DAILY


Metoprolol Tartrate 25 Mg Tab 25 Mg PO BID


Losartan (Losartan Potassium) 50 Mg Tab 50 Mg PO DAILY








Review of Systems


Except as stated in HPI:  all other systems reviewed are Neg





Physical Exam


Narrative


GENERAL: This is a 62-year-old female who appears older than her stated age.


SKIN: Warm and dry.


HEAD: Atraumatic. Normocephalic. 


EYES: Pupils equal and round. No scleral icterus. No injection or drainage. 


ENT: No nasal bleeding or discharge.  Mucous membranes pink and moist.


NECK: Trachea midline. No JVD. 


CARDIOVASCULAR: Regular rate and rhythm.  No murmur appreciated.


RESPIRATORY: No accessory muscle use. Clear to auscultation. Breath sounds 

equal bilaterally. 


GASTROINTESTINAL: Abdomen soft, non-tender, nondistended. Hepatic and splenic 

margins not palpable. 


MUSCULOSKELETAL: No obvious deformities. No clubbing.  No cyanosis.  No edema. 


NEUROLOGICAL: Awake and alert. No obvious cranial nerve deficits.  Motor 

grossly within normal limits. Normal speech.





Data


Data


Last Documented VS





Vital Signs








  Date Time  Temp Pulse Resp B/P (MAP) Pulse Ox O2 Delivery O2 Flow Rate FiO2


 


5/24/18 16:06  81 18 191/96 (127) 99 Room Air  


 


5/24/18 14:46 97.3       








Orders





 Orders


Complete Blood Count With Diff (5/24/18 15:34)


Comprehensive Metabolic Panel (5/24/18 15:34)


Iv Access Insert/Monitor (5/24/18 15:34)


Sodium Chlor 0.9% 1000 Ml Inj (Ns 1000 M (5/24/18 15:34)


Ondansetron  Odt (Zofran  Odt) (5/24/18 15:45)


Morphine Inj (Morphine Inj) (5/24/18 15:45)


Thyroid Stimulating Hormone (5/24/18 15:41)


Admit Order (Ed Use Only) (5/24/18 17:31)


Magnesium (Mg) (5/24/18 17:33)


Vitamin D, 25-Hydroxy (5/24/18 17:33)





Labs





Laboratory Tests








Test


  5/24/18


15:50


 


White Blood Count 7.0 TH/MM3 


 


Red Blood Count 4.93 MIL/MM3 


 


Hemoglobin 14.2 GM/DL 


 


Hematocrit 41.7 % 


 


Mean Corpuscular Volume 84.6 FL 


 


Mean Corpuscular Hemoglobin 28.8 PG 


 


Mean Corpuscular Hemoglobin


Concent 34.1 % 


 


 


Red Cell Distribution Width 16.4 % 


 


Platelet Count 106 TH/MM3 


 


Mean Platelet Volume 9.1 FL 


 


Neutrophils (%) (Auto) 90.8 % 


 


Lymphocytes (%) (Auto) 2.9 % 


 


Monocytes (%) (Auto) 6.1 % 


 


Eosinophils (%) (Auto) 0.0 % 


 


Basophils (%) (Auto) 0.2 % 


 


Neutrophils # (Auto) 6.3 TH/MM3 


 


Lymphocytes # (Auto) 0.2 TH/MM3 


 


Monocytes # (Auto) 0.4 TH/MM3 


 


Eosinophils # (Auto) 0.0 TH/MM3 


 


Basophils # (Auto) 0.0 TH/MM3 


 


CBC Comment DIFF FINAL 


 


Differential Comment  


 


Blood Urea Nitrogen 11 MG/DL 


 


Creatinine 0.37 MG/DL 


 


Random Glucose 147 MG/DL 


 


Total Protein 7.1 GM/DL 


 


Albumin 2.5 GM/DL 


 


Calcium Level 8.0 MG/DL 


 


Alkaline Phosphatase 64 U/L 


 


Aspartate Amino Transf


(AST/SGOT) 28 U/L 


 


 


Alanine Aminotransferase


(ALT/SGPT) 43 U/L 


 


 


Total Bilirubin 0.8 MG/DL 


 


Sodium Level 125 MEQ/L 


 


Potassium Level 3.4 MEQ/L 


 


Chloride Level 86 MEQ/L 


 


Carbon Dioxide Level 28.7 MEQ/L 


 


Anion Gap 10 MEQ/L 


 


Estimat Glomerular Filtration


Rate 176 ML/MIN 


 


 


Magnesium Level 1.7 MG/DL 


 


Thyroid Stimulating Hormone


3rd Gen 0.781 uIU/ML 


 











MDM


Medical Decision Making


Medical Screen Exam Complete:  Yes


Emergency Medical Condition:  Yes


Medical Record Reviewed:  Yes


Differential Diagnosis


Dehydration, electrolyte abnormality, hypoglycemia, symptomatic anemia, lupus 

flareup


Narrative Course


The patient was given IV fluids, morphine and Zofran.  Lab work has been 

ordered.  Upon reexamination she feels mildly improved but still reports 

overall generalized weakness.  Lab work reveals a sodium of 125 which is 

significantly decreased from 2 days ago.  This is likely contributing to her 

acute worsening of her generalized weakness.  I discussed the options with the 

patient and her  including sending her home with an antiemetic versus 

observation overnight for sodium correction and preferences for observational 

admission.





Diagnosis





 Primary Impression:  


 Hyponatremia


 Additional Impressions:  


 Nausea and vomiting


 Generalized weakness





Admitting Information


Admitting Physician Requests:  Observation











Flo Brown May 24, 2018 15:38

## 2018-05-24 NOTE — RADRPT
EXAM DATE:  5/24/2018 9:09 PM EDT

AGE/SEX:        62 years / Female



INDICATIONS:  Ileus



CLINICAL DATA:  This is the patient's initial encounter. Patient reports that signs and symptoms have
 been present for 1 day and indicates a pain score of 10/10. 

                                                                          

MEDICAL/SURGICAL HISTORY:       Hypertension.  Lupus.  Diabetes mellitus type II. Appendectomy.  Hyst
erectomy.



COMPARISON:      [facility], CT ABDOMEN & PELVIS W/O CONTRAST, 5/9/2018.  .



FINDINGS: 

 No dilated loops of small or large bowel. No evidence organomegaly. The osseous structures are gross
ly intact with mild degenerative changes in the hips. There is a 1.9 cm flocculent calcification proj
ected over the lower right iliac bone which correlates with a mesenteric calcification seen on prior 
CT 5/9/2018.



CONCLUSION:

No acute findings. No dilated loops of small or large bowel.



Electronically signed by: Jacky Ch MD  5/24/2018 9:12 PM EDT

## 2018-05-25 VITALS
DIASTOLIC BLOOD PRESSURE: 89 MMHG | OXYGEN SATURATION: 100 % | RESPIRATION RATE: 16 BRPM | TEMPERATURE: 98.1 F | HEART RATE: 64 BPM | SYSTOLIC BLOOD PRESSURE: 187 MMHG

## 2018-05-25 VITALS
TEMPERATURE: 98.6 F | RESPIRATION RATE: 20 BRPM | HEART RATE: 70 BPM | SYSTOLIC BLOOD PRESSURE: 192 MMHG | DIASTOLIC BLOOD PRESSURE: 88 MMHG | OXYGEN SATURATION: 98 %

## 2018-05-25 VITALS
SYSTOLIC BLOOD PRESSURE: 134 MMHG | DIASTOLIC BLOOD PRESSURE: 85 MMHG | RESPIRATION RATE: 16 BRPM | OXYGEN SATURATION: 100 % | TEMPERATURE: 97.3 F | HEART RATE: 75 BPM

## 2018-05-25 VITALS
OXYGEN SATURATION: 100 % | DIASTOLIC BLOOD PRESSURE: 79 MMHG | TEMPERATURE: 97.9 F | HEART RATE: 64 BPM | SYSTOLIC BLOOD PRESSURE: 161 MMHG | RESPIRATION RATE: 16 BRPM

## 2018-05-25 LAB
ALBUMIN SERPL-MCNC: 2.2 GM/DL (ref 3.4–5)
ALP SERPL-CCNC: 64 U/L (ref 45–117)
ALT SERPL-CCNC: 40 U/L (ref 10–53)
AMORPHOUS SEDIMENT, URINE: (no result)
AST SERPL-CCNC: 29 U/L (ref 15–37)
BACTERIA #/AREA URNS HPF: (no result) /HPF
BASOPHILS # BLD AUTO: 0 TH/MM3 (ref 0–0.2)
BASOPHILS NFR BLD: 0.2 % (ref 0–2)
BILIRUB SERPL-MCNC: 0.8 MG/DL (ref 0.2–1)
BUN SERPL-MCNC: 11 MG/DL (ref 7–18)
BUN SERPL-MCNC: 6 MG/DL (ref 7–18)
CALCIUM SERPL-MCNC: 7.3 MG/DL (ref 8.5–10.1)
CALCIUM SERPL-MCNC: 7.4 MG/DL (ref 8.5–10.1)
CALCIUM TP COR SERPL-MCNC: 7.4 MG/DL (ref 8.5–10.1)
CALCIUM TP COR SERPL-MCNC: 7.7 MG/DL (ref 8.5–10.1)
CHLORIDE SERPL-SCNC: 90 MEQ/L (ref 98–107)
CHLORIDE SERPL-SCNC: 95 MEQ/L (ref 98–107)
COLOR UR: YELLOW
CREAT SERPL-MCNC: 0.28 MG/DL (ref 0.5–1)
CREAT SERPL-MCNC: 0.43 MG/DL (ref 0.5–1)
DACRYOCYTES BLD QL SMEAR: (no result)
EOSINOPHIL # BLD: 0 TH/MM3 (ref 0–0.4)
EOSINOPHIL NFR BLD: 0 % (ref 0–4)
ERYTHROCYTE [DISTWIDTH] IN BLOOD BY AUTOMATED COUNT: 16.7 % (ref 11.6–17.2)
GFR SERPLBLD BASED ON 1.73 SQ M-ARVRAT: 148 ML/MIN (ref 89–?)
GFR SERPLBLD BASED ON 1.73 SQ M-ARVRAT: 243 ML/MIN (ref 89–?)
GLUCOSE SERPL-MCNC: 116 MG/DL (ref 74–106)
GLUCOSE SERPL-MCNC: 139 MG/DL (ref 74–106)
GLUCOSE UR STRIP-MCNC: (no result) MG/DL
HCO3 BLD-SCNC: 19.5 MEQ/L (ref 21–32)
HCO3 BLD-SCNC: 21.8 MEQ/L (ref 21–32)
HCT VFR BLD CALC: 42.1 % (ref 35–46)
HGB BLD-MCNC: 14.2 GM/DL (ref 11.6–15.3)
HGB UR QL STRIP: (no result)
KETONES UR STRIP-MCNC: 10 MG/DL
LYMPHOCYTES # BLD AUTO: 0.2 TH/MM3 (ref 1–4.8)
LYMPHOCYTES NFR BLD AUTO: 3.7 % (ref 9–44)
MCH RBC QN AUTO: 28.6 PG (ref 27–34)
MCHC RBC AUTO-ENTMCNC: 33.8 % (ref 32–36)
MCV RBC AUTO: 84.6 FL (ref 80–100)
MONOCYTE #: 0.4 TH/MM3 (ref 0–0.9)
MONOCYTES NFR BLD: 6.8 % (ref 0–8)
MUCOUS THREADS #/AREA URNS LPF: (no result) /LPF
NEUTROPHILS # BLD AUTO: 5.3 TH/MM3 (ref 1.8–7.7)
NEUTROPHILS NFR BLD AUTO: 89.3 % (ref 16–70)
NITRITE UR QL STRIP: (no result)
OVALOCYTES BLD QL SMEAR: (no result)
PLATELET # BLD: 102 TH/MM3 (ref 150–450)
PMV BLD AUTO: 9.1 FL (ref 7–11)
PROT SERPL-MCNC: 6.3 GM/DL (ref 6.4–8.2)
PROT SERPL-MCNC: 7.2 GM/DL (ref 6.4–8.2)
RBC # BLD AUTO: 4.97 MIL/MM3 (ref 4–5.3)
SODIUM SERPL-SCNC: 123 MEQ/L (ref 136–145)
SODIUM SERPL-SCNC: 126 MEQ/L (ref 136–145)
SP GR UR STRIP: 1.01 (ref 1–1.03)
SQUAMOUS #/AREA URNS HPF: <1 /HPF (ref 0–5)
URINE LEUKOCYTE ESTERASE: (no result)
WBC # BLD AUTO: 5.9 TH/MM3 (ref 4–11)

## 2018-05-25 RX ADMIN — PRAVASTATIN SODIUM SCH MG: 20 TABLET ORAL at 08:15

## 2018-05-25 RX ADMIN — ACETAMINOPHEN PRN MG: 325 TABLET ORAL at 08:18

## 2018-05-25 RX ADMIN — LEVOTHYROXINE SODIUM SCH MCG: 50 TABLET ORAL at 04:54

## 2018-05-25 RX ADMIN — ENALAPRILAT PRN MG: 1.25 INJECTION INTRAVENOUS at 04:54

## 2018-05-25 RX ADMIN — CLONIDINE HYDROCHLORIDE PRN MG: 0.1 INJECTION, SOLUTION EPIDURAL at 10:24

## 2018-05-25 RX ADMIN — PANTOPRAZOLE SODIUM SCH MG: 40 INJECTION, POWDER, FOR SOLUTION INTRAVENOUS at 22:16

## 2018-05-25 RX ADMIN — METOPROLOL TARTRATE SCH MG: 25 TABLET, FILM COATED ORAL at 21:04

## 2018-05-25 RX ADMIN — Medication SCH ML: at 08:14

## 2018-05-25 RX ADMIN — PANTOPRAZOLE SODIUM SCH MG: 40 INJECTION, POWDER, FOR SOLUTION INTRAVENOUS at 01:27

## 2018-05-25 RX ADMIN — CLONIDINE HYDROCHLORIDE PRN MG: 0.1 INJECTION, SOLUTION EPIDURAL at 03:59

## 2018-05-25 RX ADMIN — LOSARTAN POTASSIUM SCH MG: 50 TABLET, FILM COATED ORAL at 08:15

## 2018-05-25 RX ADMIN — PANTOPRAZOLE SODIUM SCH MG: 40 INJECTION, POWDER, FOR SOLUTION INTRAVENOUS at 10:00

## 2018-05-25 RX ADMIN — SODIUM CHLORIDE AND POTASSIUM CHLORIDE SCH MLS/HR: 9; 1.49 INJECTION, SOLUTION INTRAVENOUS at 21:17

## 2018-05-25 RX ADMIN — CLONIDINE HYDROCHLORIDE PRN MG: 0.1 INJECTION, SOLUTION EPIDURAL at 19:16

## 2018-05-25 RX ADMIN — Medication SCH ML: at 21:04

## 2018-05-25 RX ADMIN — ONDANSETRON PRN MG: 4 TABLET, ORALLY DISINTEGRATING ORAL at 04:54

## 2018-05-25 RX ADMIN — STANDARDIZED SENNA CONCENTRATE AND DOCUSATE SODIUM SCH TAB: 8.6; 5 TABLET, FILM COATED ORAL at 21:04

## 2018-05-25 RX ADMIN — SODIUM CHLORIDE AND POTASSIUM CHLORIDE SCH MLS/HR: 9; 1.49 INJECTION, SOLUTION INTRAVENOUS at 05:31

## 2018-05-25 RX ADMIN — NYSTATIN AND TRIAMCINOLONE ACETONIDE SCH APPLIC: 100000; 1 OINTMENT TOPICAL at 21:04

## 2018-05-25 RX ADMIN — ASPIRIN 81 MG SCH MG: 81 TABLET ORAL at 08:14

## 2018-05-25 RX ADMIN — CYCLOBENZAPRINE HYDROCHLORIDE PRN MG: 10 TABLET, FILM COATED ORAL at 17:02

## 2018-05-25 RX ADMIN — METOPROLOL TARTRATE SCH MG: 25 TABLET, FILM COATED ORAL at 08:15

## 2018-05-25 RX ADMIN — STANDARDIZED SENNA CONCENTRATE AND DOCUSATE SODIUM SCH TAB: 8.6; 5 TABLET, FILM COATED ORAL at 14:15

## 2018-05-25 RX ADMIN — HYDROCORTISONE SODIUM SUCCINATE SCH MG: 100 INJECTION, POWDER, FOR SOLUTION INTRAMUSCULAR; INTRAVENOUS at 05:30

## 2018-05-25 RX ADMIN — VITAMIN D, TAB 1000IU (100/BT) SCH UNITS: 25 TAB at 08:15

## 2018-05-25 NOTE — HHI.PR
Subjective


Remarks


F/u hyponatremia. Improved nausea no vomiting. Also has upper back pain





Objective


Vitals





Vital Signs








  Date Time  Temp Pulse Resp B/P (MAP) Pulse Ox O2 Delivery O2 Flow Rate FiO2


 


5/25/18 12:42   18     


 


5/25/18 10:09   20     


 


5/25/18 08:18 98.6 70 20 192/88 (122) 98   


 


5/25/18 04:41 98.1 64 16 187/89 (121) 100   


 


5/25/18 03:21   18     


 


5/24/18 23:09 98.7 71 17 175/87 (116) 100   


 


5/24/18 20:02 98.7 71 16 193/94 (127) 99   


 


5/24/18 18:00  64 18 166/90 (115) 99 Room Air  


 


5/24/18 16:06  81 18 191/96 (127) 99 Room Air  


 


5/24/18 16:06   18     


 


5/24/18 16:02   18     


 


5/24/18 14:46 97.3 70 24 185/101 (129) 99   














I/O      


 


 5/24/18 5/24/18 5/24/18 5/25/18 5/25/18 5/25/18





 07:00 15:00 23:00 07:00 15:00 23:00


 


Intake Total   1050 ml 100 ml  


 


Balance   1050 ml 100 ml  


 


      


 


Intake Oral   50 ml 100 ml  


 


IV Total   1000 ml   








Result Diagram:  


5/25/18 1048                                                                   

             5/25/18 1048





Imaging





Last Impressions








Renal Ultrasound 5/25/18 0000 Signed





Impressions: 





 CONCLUSION: 





 1.  Left renal abnormality noted on CT exam corresponds to a 1.5 x 1.5 x 1.5 cm





  simple appearing cyst by ultrasound criteria.





  





 


 


Abdomen X-Ray 5/24/18 0000 Signed





Impressions: 





 CONCLUSION:





 No acute findings. No dilated loops of small or large bowel.





  





 








Objective Remarks


GENERAL: This is a well-nourished, well-developed patient, in no apparent 

distress


SKIN: No rashes, ecchymoses or lesions. Cool and dry.


CARDIOVASCULAR: Regular rate and rhythm without murmurs, gallops, or rubs. 


RESPIRATORY: Clear to auscultation. Breath sounds equal bilaterally. No wheezes

, rales, or rhonchi.  


GASTROINTESTINAL: Abdomen soft, non-tender, nondistended.  


MUSCULOSKELETAL: Left upper back tenderness


NEUROLOGICAL: Awake and alert.   Motor and sensory grossly within normal 

limits.  Normal speech.


Procedures


none





A/P


Problem List:  


(1) Hyponatremia


ICD Code:  E87.1 - Hypo-osmolality and hyponatremia


Status:  Acute


(2) Nausea and vomiting


ICD Code:  R11.2 - Nausea with vomiting, unspecified


Status:  Acute


Assessment and Plan


62-year-old female with a history of arthritis, lupus, diabetes, hypothyroidism

, hypertension presents to the ED for evaluation of generalized weakness.





Hyponatremia from vomiting likely from med. Ct NS infusion sz precaution


Hypok. Replace 50 meq x 1. Check Mg


Vit D def. Replace


F/U calorie ct


Abdominal pain, no BM for 3 days. KUB negative. Start bowel regimen. UA 

unremarkable. US with renal cyst


Lupus, chronic. Not on chronic prednisone. Dc IV steroid Cortisol level 28.2


Hypertension, chronic, currently uncontrolled suspect due to pain and vomiting


   -Resume home medications metoprolol and losartan, Vasotec IV as needed


   -Monitor vitals


Back pain. Obtain CTA eval aorta 


DVT prophylaxis: Kavin Thakur MD May 25, 2018 14:29

## 2018-05-25 NOTE — RADRPT
EXAM DATE:  5/25/2018 9:55 AM EDT

AGE/SEX:        62 years / Female



INDICATIONS:  Renal mass seen on CT.



CLINICAL DATA:  This is the patient's initial encounter. Patient reports that signs and symptoms have
 been present for 1 day and indicates a pain score of 0/10. 

                                                                          

MEDICAL/SURGICAL HISTORY:       Anemia.  Lupus.  Hypertension.  Diabetes.   Appendectomy.  Hysterecto
my. Bladder sling. 



COMPARISON:      [facility] [place], CT ABDOMEN & PELVIS W/O CONTRAST, 5/9/2018.  .



MEASUREMENTS:

Right Kidney:__8.6 x 4.4 x 1.4 cm   cm

Left Kidney:__11.0 x 5.1 x 5.2 cm  cm



FINDINGS: 

Right Kidney: Incompletely visualized due to overlying bowel gas. No focal mass, stone or hydronephro
sis.

Left Kidney: Abnormality noted on CT exam corresponds to an anechoic 1.5 x 1.5 x 1.5 cm lesion withou
t vascularity, septations or nodule. No stone or hydronephrosis.

Bladder: Within normal limits given the degree of distension. 



CONCLUSION: 

1.  Left renal abnormality noted on CT exam corresponds to a 1.5 x 1.5 x 1.5 cm simple appearing cyst
 by ultrasound criteria.



Electronically signed by: Ruddy Vuong MD  5/25/2018 10:35 AM EDT

## 2018-05-26 VITALS
OXYGEN SATURATION: 100 % | TEMPERATURE: 97.2 F | SYSTOLIC BLOOD PRESSURE: 128 MMHG | DIASTOLIC BLOOD PRESSURE: 94 MMHG | RESPIRATION RATE: 16 BRPM | HEART RATE: 61 BPM

## 2018-05-26 VITALS — SYSTOLIC BLOOD PRESSURE: 167 MMHG | DIASTOLIC BLOOD PRESSURE: 75 MMHG

## 2018-05-26 VITALS
RESPIRATION RATE: 18 BRPM | TEMPERATURE: 97.5 F | DIASTOLIC BLOOD PRESSURE: 99 MMHG | HEART RATE: 64 BPM | SYSTOLIC BLOOD PRESSURE: 186 MMHG | OXYGEN SATURATION: 100 %

## 2018-05-26 VITALS
TEMPERATURE: 98 F | OXYGEN SATURATION: 99 % | HEART RATE: 67 BPM | DIASTOLIC BLOOD PRESSURE: 86 MMHG | SYSTOLIC BLOOD PRESSURE: 171 MMHG | RESPIRATION RATE: 18 BRPM

## 2018-05-26 VITALS
HEART RATE: 86 BPM | RESPIRATION RATE: 18 BRPM | TEMPERATURE: 97.7 F | OXYGEN SATURATION: 99 % | SYSTOLIC BLOOD PRESSURE: 173 MMHG | DIASTOLIC BLOOD PRESSURE: 103 MMHG

## 2018-05-26 VITALS
SYSTOLIC BLOOD PRESSURE: 179 MMHG | HEART RATE: 62 BPM | DIASTOLIC BLOOD PRESSURE: 94 MMHG | TEMPERATURE: 97.4 F | OXYGEN SATURATION: 100 % | RESPIRATION RATE: 18 BRPM

## 2018-05-26 LAB
BASOPHILS # BLD AUTO: 0 TH/MM3 (ref 0–0.2)
BASOPHILS NFR BLD: 0.1 % (ref 0–2)
BUN SERPL-MCNC: 9 MG/DL (ref 7–18)
CALCIUM SERPL-MCNC: 7.4 MG/DL (ref 8.5–10.1)
CALCIUM TP COR SERPL-MCNC: 7.7 MG/DL (ref 8.5–10.1)
CHLORIDE SERPL-SCNC: 98 MEQ/L (ref 98–107)
CREAT SERPL-MCNC: 0.29 MG/DL (ref 0.5–1)
EOSINOPHIL # BLD: 0 TH/MM3 (ref 0–0.4)
EOSINOPHIL NFR BLD: 0.1 % (ref 0–4)
ERYTHROCYTE [DISTWIDTH] IN BLOOD BY AUTOMATED COUNT: 16.8 % (ref 11.6–17.2)
GFR SERPLBLD BASED ON 1.73 SQ M-ARVRAT: 234 ML/MIN (ref 89–?)
GLUCOSE SERPL-MCNC: 101 MG/DL (ref 74–106)
HCO3 BLD-SCNC: 21.4 MEQ/L (ref 21–32)
HCT VFR BLD CALC: 38.6 % (ref 35–46)
HGB BLD-MCNC: 13 GM/DL (ref 11.6–15.3)
LYMPHOCYTES # BLD AUTO: 0.3 TH/MM3 (ref 1–4.8)
LYMPHOCYTES NFR BLD AUTO: 5.5 % (ref 9–44)
MAGNESIUM SERPL-MCNC: 1.7 MG/DL (ref 1.5–2.5)
MCH RBC QN AUTO: 28.5 PG (ref 27–34)
MCHC RBC AUTO-ENTMCNC: 33.8 % (ref 32–36)
MCV RBC AUTO: 84.5 FL (ref 80–100)
MONOCYTE #: 0.4 TH/MM3 (ref 0–0.9)
MONOCYTES NFR BLD: 7.8 % (ref 0–8)
NEUTROPHILS # BLD AUTO: 4.7 TH/MM3 (ref 1.8–7.7)
NEUTROPHILS NFR BLD AUTO: 86.5 % (ref 16–70)
PLATELET # BLD: 106 TH/MM3 (ref 150–450)
PMV BLD AUTO: 9.1 FL (ref 7–11)
PROT SERPL-MCNC: 6.5 GM/DL (ref 6.4–8.2)
RBC # BLD AUTO: 4.57 MIL/MM3 (ref 4–5.3)
SODIUM SERPL-SCNC: 129 MEQ/L (ref 136–145)
WBC # BLD AUTO: 5.4 TH/MM3 (ref 4–11)

## 2018-05-26 RX ADMIN — Medication SCH ML: at 10:12

## 2018-05-26 RX ADMIN — METOPROLOL TARTRATE SCH MG: 25 TABLET, FILM COATED ORAL at 10:04

## 2018-05-26 RX ADMIN — STANDARDIZED SENNA CONCENTRATE AND DOCUSATE SODIUM SCH TAB: 8.6; 5 TABLET, FILM COATED ORAL at 10:04

## 2018-05-26 RX ADMIN — NYSTATIN AND TRIAMCINOLONE ACETONIDE SCH APPLIC: 100000; 1 OINTMENT TOPICAL at 10:12

## 2018-05-26 RX ADMIN — ENALAPRILAT PRN MG: 1.25 INJECTION INTRAVENOUS at 20:56

## 2018-05-26 RX ADMIN — PANTOPRAZOLE SODIUM SCH MG: 40 INJECTION, POWDER, FOR SOLUTION INTRAVENOUS at 21:59

## 2018-05-26 RX ADMIN — PANTOPRAZOLE SODIUM SCH MG: 40 INJECTION, POWDER, FOR SOLUTION INTRAVENOUS at 10:05

## 2018-05-26 RX ADMIN — Medication SCH ML: at 20:56

## 2018-05-26 RX ADMIN — LEVOTHYROXINE SODIUM SCH MCG: 50 TABLET ORAL at 06:10

## 2018-05-26 RX ADMIN — STANDARDIZED SENNA CONCENTRATE AND DOCUSATE SODIUM SCH TAB: 8.6; 5 TABLET, FILM COATED ORAL at 20:56

## 2018-05-26 RX ADMIN — PRAVASTATIN SODIUM SCH MG: 20 TABLET ORAL at 10:04

## 2018-05-26 RX ADMIN — ACETAMINOPHEN PRN MG: 325 TABLET ORAL at 21:00

## 2018-05-26 RX ADMIN — ASPIRIN 81 MG SCH MG: 81 TABLET ORAL at 10:04

## 2018-05-26 RX ADMIN — CLONIDINE HYDROCHLORIDE PRN MG: 0.1 INJECTION, SOLUTION EPIDURAL at 10:05

## 2018-05-26 RX ADMIN — LOSARTAN POTASSIUM SCH MG: 50 TABLET, FILM COATED ORAL at 10:03

## 2018-05-26 RX ADMIN — ENALAPRILAT PRN MG: 1.25 INJECTION INTRAVENOUS at 03:44

## 2018-05-26 RX ADMIN — CYCLOBENZAPRINE HYDROCHLORIDE PRN MG: 10 TABLET, FILM COATED ORAL at 10:03

## 2018-05-26 RX ADMIN — CYCLOBENZAPRINE HYDROCHLORIDE PRN MG: 10 TABLET, FILM COATED ORAL at 01:18

## 2018-05-26 RX ADMIN — SODIUM CHLORIDE AND POTASSIUM CHLORIDE SCH MLS/HR: 9; 1.49 INJECTION, SOLUTION INTRAVENOUS at 15:59

## 2018-05-26 RX ADMIN — NYSTATIN AND TRIAMCINOLONE ACETONIDE SCH APPLIC: 100000; 1 OINTMENT TOPICAL at 20:56

## 2018-05-26 RX ADMIN — VITAMIN D, TAB 1000IU (100/BT) SCH UNITS: 25 TAB at 10:04

## 2018-05-26 RX ADMIN — CLONIDINE HYDROCHLORIDE PRN MG: 0.1 INJECTION, SOLUTION EPIDURAL at 01:18

## 2018-05-26 NOTE — HHI.PR
Subjective


Remarks


62-year-old female admitted for nausea vomiting and electrolyte imbalance 

complains today of left hand and left lower extremity weakness.  These symptoms 

have appeared since yesterday.  She denies any slurring of her voice and family 

denies any facial asymmetry that has not already her baseline.  She denies any 

history of previous stroke.





Objective


Vitals





Vital Signs








  Date Time  Temp Pulse Resp B/P (MAP) Pulse Ox O2 Delivery O2 Flow Rate FiO2


 


5/26/18 11:31 97.2 61 16 128/94 (105) 100   


 


5/26/18 08:00 98.0 67 18 171/86 (114) 99   


 


5/26/18 04:00 97.4 62 18 179/94 (122) 100   


 


5/26/18 01:13    167/75 (105)    


 


5/26/18 00:30 97.5 64 18 186/99 (128) 100   


 


5/25/18 23:34 97.9 64 16 161/79 (106) 100   


 


5/25/18 21:00 97.3 75 16 134/85 (101) 100   


 


5/25/18 20:20   16     














I/O      


 


 5/25/18 5/25/18 5/25/18 5/26/18 5/26/18 5/26/18





 07:00 15:00 23:00 07:00 15:00 23:00


 


Intake Total 100 ml   240 ml  


 


Balance 100 ml   240 ml  


 


      


 


Intake Oral 100 ml   240 ml  


 


# Voids    2  








Result Diagram:  


5/26/18 0637                                                                   

             5/26/18 0637





Objective Remarks


GENERAL: Well-nourished, well-developed patient.


SKIN: Warm and dry.


HEAD: Normocephalic.


EYES: No scleral icterus. No injection or drainage. 


NECK: Supple, trachea midline. No JVD or lymphadenopathy.


CARDIOVASCULAR: Regular rate and rhythm without murmurs, gallops, or rubs. 


RESPIRATORY: Breath sounds equal bilaterally. No accessory muscle use.


GASTROINTESTINAL: Abdomen soft, non-tender, nondistended. 


EXTREMITIES: No cyanosis, or edema. 


NEUROLOGICAL: Awake, alert, and oriented x 3.  Left hand and leg are 3 out of 5 

strength, compared to 5 out of 5 on right


Procedures


none





A/P


Problem List:  


(1) Hyponatremia


ICD Code:  E87.1 - Hypo-osmolality and hyponatremia


Status:  Acute


(2) Nausea and vomiting


ICD Code:  R11.2 - Nausea with vomiting, unspecified


Status:  Acute


Assessment and Plan








62-year-old female with a history of arthritis, lupus, diabetes, hypothyroidism

, hypertension presents to the ED for evaluation of generalized weakness.





Left upper and lower extremity weakness


Suspicious for stroke, STAT CT brain is negative


MRI of brain is pending


We will focus also on correcting hyponatremia and hypocalcemia


Blood pressure meds held for now


We will consult neurology if stroke is present on MRI


Will get MRI of C-spine if MRI of brain is negative





Hyponatremia


Likely from vomiting and dehydration


Continue IV fluid replacement





Hypocalcemia


Possibly related to muscle discoordination, however asymmetrical presentation 

is more suspicious for stroke


Calcium gluconate 1 g IV, recheck calcium in a.m.





h/o Lupus 


Consider flareup if symptoms remain unexplainable by other causes


Patient voices no joint pain complaints, she takes no steroids for this





Hypertension


Home meds held until stroke ruled out





Chronic low back pain


Patient requested K pad





DVT prophylaxis


Naga Quintero MD May 26, 2018 17:50

## 2018-05-26 NOTE — RADRPT
EXAM DATE:  5/26/2018 7:04 PM EDT

AGE/SEX:        62 years / Female



INDICATIONS:    Left sided weakness.



CLINICAL DATA:  This is the patient's initial encounter. Patient reports that signs and symptoms have
 been present for 2 days and indicates a pain score of 0/10. 

                                                                          

MEDICAL/SURGICAL HISTORY:       Hypertension. . Bladder sling.



COMPARISON:      [facility] [place], CT BRAIN W/O CONTRAST, 5/26/2018.  .



TECHNIQUE: Multiplanar, multisequence examination of the brain was performed without contrast.



FINDINGS:  

Cerebrum:  Mild T2 hyperintensity is identified in several sulci in the occipital regions. Small hypo
intense collections are identified in the occipital horns of the lateral ventricles. Cerebral cortex 
is otherwise unremarkable. There is no evidence of restricted diffusion, parenchymal hemorrhage, mass
 effect or edema.

White Matter:  Small punctate hyperintensities are scattered throughout the cerebral white matter pre
dominantly in the subcortical white matter tracts.

Posterior Fossa: Minimal hyperintensity is seen along the cerebellar folia of the vermis.

Diffusion Imaging:  No focal areas of restricted diffusion are seen.  No evidence of acute infarction
.

Extracranial:  The visualized portions of the orbits and paranasal sinuses are unremarkable.







CONCLUSION: 

1.  FLAIR T2 hyperintensity along the cortical surface of the occipital lobes characteristic of minim
al subarachnoid blood.

2.  Trace blood in the ventricles.

3.  No evidence of acute infarct, parenchymal hemorrhage, mass or edema.

4.  Cerebral white matter disease characteristic of mild microvascular angiopathy.



Electronically signed by: Marcelo Delaney MD  5/26/2018 7:13 PM EDT

## 2018-05-26 NOTE — RADRPT
EXAM DATE:  5/26/2018 1:01 PM EDT

AGE/SEX:        62 years / Female



INDICATIONS:  Left side weakness.



CLINICAL DATA:  This is the patient's initial encounter. Patient reports that signs and symptoms have
 been present for 1 day and indicates a pain score of 9/10. 

                                                                          

MEDICAL/SURGICAL HISTORY:   Lupus.  Rheumatoid arthritis.  Cardiovascular disease.  Hypertension, edwin
betes.  Appendectomy.  Hysterectomy.



RADIATION DOSE:  33.82 CTDI (mGy) ; Patient positioning







COMPARISON:         CT of the brain May 22, 2018.





TECHNIQUE:  CT of the head without contrast.  Using automated exposure control and adjustment of the 
mA and/or kV according to patient size, radiation dose was kept as low as reasonably achievable to ob
tain optimal diagnostic quality images.



FINDINGS: 

Cerebrum:  The ventricles are normal for age.  No evidence of midline shift, mass lesion, hemorrhage 
or acute infarction.  No extraaxial fluid collections are seen.

Posterior Fossa:  The cerebellum and brainstem are intact.  The 4th ventricle is midline.  The cerebe
llopontine angle is unremarkable.

Extracranial:  The visualized portion of the orbits is intact.

Skull:  The calvaria is intact.  No evidence of skull fracture.



CONCLUSION:

1.  Negative CT Head non contrast.



Electronically signed by: Jacky Nichols MD  5/26/2018 1:08 PM EDT

## 2018-05-27 VITALS
OXYGEN SATURATION: 99 % | DIASTOLIC BLOOD PRESSURE: 96 MMHG | SYSTOLIC BLOOD PRESSURE: 195 MMHG | RESPIRATION RATE: 18 BRPM | HEART RATE: 95 BPM | TEMPERATURE: 98.7 F

## 2018-05-27 VITALS
SYSTOLIC BLOOD PRESSURE: 135 MMHG | DIASTOLIC BLOOD PRESSURE: 65 MMHG | OXYGEN SATURATION: 100 % | TEMPERATURE: 97.9 F | HEART RATE: 92 BPM | RESPIRATION RATE: 16 BRPM

## 2018-05-27 VITALS
OXYGEN SATURATION: 96 % | DIASTOLIC BLOOD PRESSURE: 79 MMHG | HEART RATE: 97 BPM | RESPIRATION RATE: 16 BRPM | SYSTOLIC BLOOD PRESSURE: 156 MMHG | TEMPERATURE: 98.5 F

## 2018-05-27 VITALS
DIASTOLIC BLOOD PRESSURE: 79 MMHG | RESPIRATION RATE: 18 BRPM | HEART RATE: 102 BPM | TEMPERATURE: 99 F | OXYGEN SATURATION: 99 % | SYSTOLIC BLOOD PRESSURE: 139 MMHG

## 2018-05-27 VITALS
RESPIRATION RATE: 18 BRPM | OXYGEN SATURATION: 100 % | DIASTOLIC BLOOD PRESSURE: 88 MMHG | SYSTOLIC BLOOD PRESSURE: 165 MMHG | TEMPERATURE: 99.4 F | HEART RATE: 101 BPM

## 2018-05-27 VITALS
RESPIRATION RATE: 16 BRPM | DIASTOLIC BLOOD PRESSURE: 92 MMHG | SYSTOLIC BLOOD PRESSURE: 182 MMHG | HEART RATE: 90 BPM | TEMPERATURE: 98.7 F | OXYGEN SATURATION: 99 %

## 2018-05-27 VITALS
DIASTOLIC BLOOD PRESSURE: 99 MMHG | TEMPERATURE: 98 F | HEART RATE: 98 BPM | OXYGEN SATURATION: 99 % | SYSTOLIC BLOOD PRESSURE: 190 MMHG | RESPIRATION RATE: 18 BRPM

## 2018-05-27 LAB
BUN SERPL-MCNC: 14 MG/DL (ref 7–18)
CALCIUM SERPL-MCNC: 7.5 MG/DL (ref 8.5–10.1)
CALCIUM TP COR SERPL-MCNC: 8.1 MG/DL (ref 8.5–10.1)
CHLORIDE SERPL-SCNC: 102 MEQ/L (ref 98–107)
CREAT SERPL-MCNC: 0.47 MG/DL (ref 0.5–1)
CRP SERPL-MCNC: 1.3 MG/DL (ref 0–0.3)
FOLATE SERPL-MCNC: 11.6 NG/ML (ref 3.1–17.5)
GFR SERPLBLD BASED ON 1.73 SQ M-ARVRAT: 134 ML/MIN (ref 89–?)
GLUCOSE SERPL-MCNC: 101 MG/DL (ref 74–106)
HCO3 BLD-SCNC: 22.4 MEQ/L (ref 21–32)
INR PPP: 1 RATIO
MAGNESIUM SERPL-MCNC: 1.6 MG/DL (ref 1.5–2.5)
PROT SERPL-MCNC: 6 GM/DL (ref 6.4–8.2)
PROTHROMBIN TIME: 9.8 SEC (ref 9.8–11.6)
RHEUMATOID FACT SER QL LA: NEGATIVE
SODIUM SERPL-SCNC: 133 MEQ/L (ref 136–145)
T4 FREE SERPL-MCNC: 1.54 NG/DL (ref 0.76–1.46)
TROPONIN I SERPL-MCNC: 0.04 NG/ML (ref 0.02–0.05)

## 2018-05-27 RX ADMIN — ENALAPRILAT PRN MG: 1.25 INJECTION INTRAVENOUS at 18:12

## 2018-05-27 RX ADMIN — ACETAMINOPHEN PRN MG: 325 TABLET ORAL at 23:52

## 2018-05-27 RX ADMIN — STANDARDIZED SENNA CONCENTRATE AND DOCUSATE SODIUM SCH TAB: 8.6; 5 TABLET, FILM COATED ORAL at 09:33

## 2018-05-27 RX ADMIN — ENALAPRILAT PRN MG: 1.25 INJECTION INTRAVENOUS at 21:20

## 2018-05-27 RX ADMIN — PRAVASTATIN SODIUM SCH MG: 20 TABLET ORAL at 09:33

## 2018-05-27 RX ADMIN — HYDROCORTISONE SODIUM SUCCINATE SCH MG: 100 INJECTION, POWDER, FOR SOLUTION INTRAMUSCULAR; INTRAVENOUS at 23:42

## 2018-05-27 RX ADMIN — Medication SCH ML: at 21:30

## 2018-05-27 RX ADMIN — CLONIDINE HYDROCHLORIDE PRN MG: 0.1 INJECTION, SOLUTION EPIDURAL at 09:34

## 2018-05-27 RX ADMIN — VITAMIN D, TAB 1000IU (100/BT) SCH UNITS: 25 TAB at 09:33

## 2018-05-27 RX ADMIN — METOPROLOL TARTRATE SCH MG: 25 TABLET, FILM COATED ORAL at 21:20

## 2018-05-27 RX ADMIN — HYDROCORTISONE SODIUM SUCCINATE SCH MG: 100 INJECTION, POWDER, FOR SOLUTION INTRAMUSCULAR; INTRAVENOUS at 18:12

## 2018-05-27 RX ADMIN — NYSTATIN AND TRIAMCINOLONE ACETONIDE SCH APPLIC: 100000; 1 OINTMENT TOPICAL at 09:00

## 2018-05-27 RX ADMIN — Medication SCH ML: at 09:00

## 2018-05-27 RX ADMIN — LEVOTHYROXINE SODIUM SCH MCG: 50 TABLET ORAL at 05:14

## 2018-05-27 RX ADMIN — NYSTATIN AND TRIAMCINOLONE ACETONIDE SCH APPLIC: 100000; 1 OINTMENT TOPICAL at 21:30

## 2018-05-27 RX ADMIN — PANTOPRAZOLE SODIUM SCH MG: 40 INJECTION, POWDER, FOR SOLUTION INTRAVENOUS at 10:00

## 2018-05-27 RX ADMIN — LOSARTAN POTASSIUM SCH MG: 50 TABLET, FILM COATED ORAL at 09:00

## 2018-05-27 RX ADMIN — CLONIDINE HYDROCHLORIDE PRN MG: 0.1 INJECTION, SOLUTION EPIDURAL at 00:41

## 2018-05-27 RX ADMIN — STANDARDIZED SENNA CONCENTRATE AND DOCUSATE SODIUM SCH TAB: 8.6; 5 TABLET, FILM COATED ORAL at 21:21

## 2018-05-27 RX ADMIN — PANTOPRAZOLE SODIUM SCH MG: 40 INJECTION, POWDER, FOR SOLUTION INTRAVENOUS at 21:20

## 2018-05-27 NOTE — MB
cc:

Jaren Smith MD

****

 

 

DATE:

05/27/2018

 

HISTORY OF PRESENT ILLNESS:

This is a 62-year-old right handed woman with a history of 

hypertension, non-insulin dependent diabetes, hypercholesterolemia, 

hypothyroidism, lupus for over 27 years, rheumatoid arthritis.

 

Her lupus mainly was dry eyes, a bit of a rash on her back, but 

sometimes on her face she would have a rash.  She was on Plaquenil 

some years back, but it affected her vision, so she stopped it.  She 

was on 5 mg of prednisone a day, but that evidently was stopped about 

2 years ago.

 

In 04/2016, she was recommended by a rheumatologist to start one of 

the biologics, but she could not afford it, and probably one of the 

monoclonal antibodies, but she did not take it and she has not been on

any treatment since.

 

She has had some weight loss in the last year.  She has had occasional

headaches but not frequent.

 

Last Wednesday, she developed pain in her neck on the back of her head

and left side, down the back of her shoulder and noticed shortly 

thereafter or within a day thereafter some numbness in the left lower 

leg and left hand would not work and came into the hospital.  She has 

been on 81 mg of aspirin every day but no other blood thinners.

 

She was found here to have a mild subarachnoid and intraventricular 

hemorrhage noted on MRI.  Neurosurgery had seen her, noticed that she 

was weak on the left side.  She has had a little bit of a droopiness 

to the left eye in the last several days.  She has a history of 

scleral injection in the left eye, not uncommonly.  She was seen in 

the ER about a week ago with generally feeling weak and anemia.

 

She was admitted to the hospital on 05/22 because she felt her arm 

numb and pain in her chest and up in her neck.

 

REVIEW OF SYSTEMS:

She denies any history of MI, CABG, stent, angioplasty, AFib, 

Coumadin; renal, hepatic, pulmonary disease; ulcer, cancer, seizure, 

stroke.

 

SOCIAL HISTORY:

Not a smoker or drinker.  No drugs.  Lives with her .

 

FAMILY HISTORY:

Negative for cancer, seizure, stroke.  Negative for lupus.

 

MEDICATIONS:

She was just on, it looks, like prednisone taper.  Also on nystatin, 

tramadol, thyroid medicine, metformin, 81 of aspirin, lovastatin, 

metoprolol, losartan.

 

ALLERGIES:

SHE IS ALLERGIC TO CODEINE.

 

PHYSICAL EXAMINATION:

VITAL SIGNS:  Afebrile, 92, 16, 135/65.  Initial blood pressure 

198/95.

CARDIOVASCULAR:  There were no carotid bruits.  Heart was regular 

rhythm.  I did not detect a murmur.

NEUROLOGIC AND MUSCULOSKELETAL:  Pupils are equal.  Visual fields are 

full.  Extraocular movements intact, without nystagmus.  There is some

slight ptosis to the left eye, but she is able to open it fully.  Face

moves symmetrically with normal sensation.  Tongue was midline.  She 

is nontender about the cervical spine.  There is no mass there.  There

is a slight vague rash on the left shoulder, not severe.  She had 

normal strength in the right upper extremity throughout, except the 

FDI appears to be weak on the right.  On the left upper extremity, her

deltoid is normal, as is the biceps, but her triceps is very weak, 

about 3+/5; finger extensors 2/5,  3-/5.  Left lower extremity 

iliopsoas is about 2/5, as is hamstring and quadriceps; dorsiflexion 

of the foot is about a 1/5.  Toes were downgoing bilaterally.  There 

is no ankle clonus.  She is slightly hyperreflexive on the left knee 

jerk compared to the right, about a 3+ on the left and 2+ on the 

right; trace in the upper extremities.  Pinprick is diminished 

actually on the right side of her leg and arm compared to the left, 

and also on her body. She does not have a definite pin level but 

appears to have some diminished pinprick starting up in the cervical 

region on the right side, although she can still feel a pinprick, 

although still feels sharpness; however, it is inconsistent.  She has 

been having some urinary incontinence according to her daughter.

 

LABORATORY DATA:

Her sed rate was 77 on 05/17/2018, now it is 12.  Her CBC is normal, 

except her platelet count is low at 102; it was 157 on 05/22, 187 on 

05/09.  Urine drug screen is negative.  UA showed 10 ketones, 7 red 

cells.  Coags normal.  Basic metabolic profile, her sodium was 135 on 

05/09, on 05/17 at 136, on admission here 136 and then it dropped down

to 123 and has been slowly coming up.  Her creatinine is normal.  

Calcium is corrected low at 8.1, magnesium is 1.6.  Total protein is 

low at 6.  LFTs were normal.  Albumin 2.2.  Vitamin D low at 26.  

Cortisol levels normal.  Troponins were negative.  TSH normal.  B12 

was normal on 05/17.  Lipase normal.  LDL cholesterol normal.  CRP is 

only 1.3.

 

IMAGING:

She had a CAT scan of her brain on 05/22 that was read as negative.  

She had another CT of the brain on 05/26, also basically negative.  

She had an aortic CTA which is negative.  She had an MRI of her brain 

that showed minimal subarachnoid blood, some trace blood in the 

ventricles, some white matter changes; showed some diffuse 

subarachnoid hemorrhage posteriorly, white matter change in the right 

basal ganglia region, some mild white matter changes throughout.  Some

periventricular hyperintensity on the bilateral occipital poles of the

occipital horn to the lateral ventricle, could be reactive.  The MRI 

of the cervical spine today is read as diffuse spinal cord enlargement

with edema and some intramedullary hemorrhage and left lateral 

extraaxial hemorrhage, small focus of enhancement at the C6 level.  No

cord compression was noted per se.  Unfortunately, the cervical spine 

film is not coming up with what is in the computer here, but I did get

to review those with Dr. Delaney down in radiology prior to coming 

up to the floor.

 

IMPRESSION AND PLAN:

History of lupus and rheumatoid.  Sedimentation rate elevated a few 

days ago, really sounds like some hemorrhage around the cord with cord

edema that may have refluxed up into the brain.

 

We will check a CTA of the neck and [place] of [place].  A cord AVM 

could be considered.  Vasculitis or lupus involvement is also 

considered.

 

I think probably best is to transfer the patient to [facility] for 

arteriogram of the spinal cord to see if there is any spinal cord 

arteriovenous malformation and we will give her high-dose steroids at 

this time.  Steroids are hydrocortisone 100 mg q. 6 hours.  Other meds

are that of Flexeril p.r.n.; Pravachol, which we will stop at this 

time; thyroid medicine and some other p.r.n. medications.  Her blood 

pressure has been elevated here and it will be okay to run her 120-140

systolic.  I notified the nurse.

 

I think the best thing to do would be to transfer up to [facility] or [facility]

to get an arteriogram of her spinal cord.  I have ordered some other 

workup here, but we will try to get her transferred out tonight.

 

ADDENDUM

The patient overall has left ptosis, weakness of the left arm, from C7

down, entire weakness of the left lower extremity, tight hemisensory 

loss.  This could all go with her cord injury and I think she may have

a spinal cord AVM.

 

In talking with her family and herself, she appears to have not 

changed since Thursday, so she has been fairly stable neurologically 

since Thursday i.e., the last 3 days.

 

 

 

__________________________________

MD MK Milna/HARRISON/

D: 05/27/2018, 06:13 PM

T: 05/27/2018, 07:31 PM

Visit #: [id number]

Job #: [id number]

## 2018-05-27 NOTE — RADRPT
EXAM DATE:  5/27/2018 7:31 PM EDT

AGE/SEX:        62 years / Female



INDICATIONS:    CVA.



CLINICAL DATA:  This is the patient's initial encounter. Patient reports that signs and symptoms have
 been present for 2 days and indicates a pain score of 0/10. 

                                                                          

MEDICAL/SURGICAL HISTORY:       Hypertension. . Bladder sling.



COMPARISON:      [facility], MRI BRAIN W/O CONTRAST, 5/26/2018.  .



TECHNIQUE: Multiplanar, multisequence examination of the brain was performed without and with 8 ml Om
niscan (gadodiamide) contrast as a single exam dose.







FINDINGS:  

Sulcal T2 hyperintensity remains evident on FLAIR imaging. These findings are characteristic of the s
ubarachnoid blood. There is been no change in the amount compared to the prior MRI.



Trace blood is also evident in the occipital horns of the lateral ventricles.



There is no evidence of restricted diffusion to indicate an evolving infarct. There is no significant
 parenchymal hemorrhage.



CSF spaces are stable. There is no significant ventricular dilatation.



CONCLUSION: 

1.  Stable evaluation the brain continuing to demonstrate a small amount subarachnoid blood in the oc
cipital region and trace hemorrhage within the lateral ventricles

2.  No evidence of acute infarct, parenchymal hemorrhage, mass, edema or enhancing lesions.





Electronically signed by: Marcelo Delaney MD  5/27/2018 7:37 PM EDT

## 2018-05-27 NOTE — HHI.NSPN
__________________________________________________





History


Chief Complaint:  Pain to the back of the head and neck.


Interval History


05/26: The patient is a 62-year-old female he was just discharged from the 

hospital on 5/25/2018 following admission for chest pain, with subsequent 

cardiology clearance.


The patient states that since approximately November 2017 she has experienced 

generalized fatigue and malaise along with weight loss.


She states that in December 2017 her doctor thought she may have had a small 

stroke due to facial droop.


She states that after being discharged from the hospital yesterday she has 

significant nausea and vomiting.  She also developed headache along with left 

upper and lower extremity weakness.  She states that her systolic blood 

pressure was up to 240 on this past Wednesday.  Her vital signs noted in the 

medical records indicate that she was in the 180s-190s systolic range for much 

of the day on 520 4/18 through 5/25/18 as well as blood pressure documented is 

186/99 earlier today.


She denies any significant problems with vertigo.  No definite confusion.


05/27: The patient is awake in bed visiting with family. She complains of pain 

to the back of the head and to the neck. She denies any nausea or dizziness. 

She also denies any pain, numbness or tingling to the extremities but she does 

say she has weakness. Her daughter did say that the patient did report some 

numbness to the lower extremities. Upon evaluation the patient does have 

tenderness to the inferior occipital scalp and along the midline cervical 

spine. She had no sensory deficits to the extremities but had left lower 

extremity weakness as well as some to the left upper extremity. The hand 

intrinsics and extrinsics were weak bilaterally and more so on the left. She 

had a slight facial droop.





Exam


Results











 5/25/18 5/25/18 5/26/18 5/26/18 5/27/18 5/27/18





 06:00 18:00 06:00 18:00 06:00 18:00


 


Intake Total 50 ml 100 ml 240 ml  240 ml 


 


Balance 50 ml 100 ml 240 ml  240 ml 


 


      


 


Intake Oral 50 ml 100 ml 240 ml  240 ml 


 


# Voids   2 4 3 


 


# Bowel Movements     1 








Vital Signs








  Date Time  Temp Pulse Resp B/P (MAP) Pulse Ox O2 Delivery O2 Flow Rate FiO2


 


5/27/18 12:00 97.9 92 16 135/65 (88) 100   


 


5/27/18 08:00 98.5 97 16 156/79 (104) 96   


 


5/27/18 04:00 99.0 102 18 139/79 (99) 99   


 


5/27/18 00:00 98.7 90 16 182/92 (122) 99   


 


5/26/18 20:00 97.7 86 18 173/103 (126) 99   


 


5/26/18 11:31 97.2 61 16 128/94 (105) 100   


 


5/26/18 08:00 98.0 67 18 171/86 (114) 99   


 


5/26/18 04:00 97.4 62 18 179/94 (122) 100   


 


5/26/18 01:13    167/75 (105)    


 


5/26/18 00:30 97.5 64 18 186/99 (128) 100   


 


5/25/18 23:34 97.9 64 16 161/79 (106) 100   


 


5/25/18 21:00 97.3 75 16 134/85 (101) 100   


 


5/25/18 20:20   16     


 


5/25/18 10:09   20     


 


5/25/18 08:18 98.6 70 20 192/88 (122) 98   


 


5/25/18 04:41 98.1 64 16 187/89 (121) 100   


 


5/25/18 03:21   18     


 


5/24/18 23:09 98.7 71 17 175/87 (116) 100   


 


5/24/18 20:02 98.7 71 16 193/94 (127) 99   


 


5/24/18 18:00  64 18 166/90 (115) 99 Room Air  








Physical Examination


GENERAL: Awake & alert in bed visiting w/family. Affect flat but readily 

interacts. No apparent distress. 


HEENT: Normocephalic, atraumatic. TTP to inferior occipital scalp. PERRLA 2 mm, 

EOMI, left sclera injected, partial left ptosis. Mild left-sided facial droop. 

MMM & pink, tongue midline to protrusion.


NECK: Midline cervical spine mildly TTP. No JVD. Trachea midline. 


MUSCULOSKELETAL: EDMONDS spontaneously & purposefully. Severe muscle atrophy to all 

extremities. 


NEUROLOGICAL:


AAOx3. 


Speech soft but clear, slow but appropriate. 


Follows simple commands w/o difficulty. 


PERRLA 2 mm, EOMI, partial left ptosis. Left-sided facial weakness (decrease 

eyebrow shrug, smile & puffing out cheeks). Tongue midline to protrusion.  


Sensation is intact to light touch to all extremities. 


Motor strength: 


   LUE: Deltoid 4/5, biceps 3+ to 4/5, triceps 3/5, wrist flexors 4 to 4+/5, 

wrist extensors 2+ to 3/5, hand intrinsics & extrinsics 1/5. 


   RUE: Deltoid 4+/5, biceps 4+ to 5/5, triceps 4+/5, wrist flexors & extensors 

4+ to 5/5, hand intrinsics & extrinsics 3/5. 


   LLE: Iliopsoas 3 to 3+/5, quadriceps 1/5, hamstring 3/5, tibialis anterior 0/

5, gastrocsoleus 2/5, extensor hallucis longus 0/5. 


   RLE: Iliopsoas 4+/5, quadriceps 5/5, hamstring 4+/5, tibialis anterior 4 to 4

+/5, gastrocsoleus 4+/5, extensor hallucis longus 4+/5.


Lab, Micro, Other Results





Recent Impressions








Head CT 5/26/18 0000 Signed





Impressions: 





 CONCLUSION:





 1.  Negative CT Head non contrast.





  





 


 


Brain MRI 5/26/18 0000 Signed





Impressions: 





 CONCLUSION: 





 1.  FLAIR T2 hyperintensity along the cortical surface of the occipital lobes c





 haracteristic of minimal subarachnoid blood.





 2.  Trace blood in the ventricles.





 3.  No evidence of acute infarct, parenchymal hemorrhage, mass or edema.





 4.  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





 hy.





  





 


 


Renal Ultrasound 5/25/18 0000 Signed





Impressions: 





 CONCLUSION: 





 1.  Left renal abnormality noted on CT exam corresponds to a 1.5 x 1.5 x 1.5 cm





  simple appearing cyst by ultrasound criteria.





  





 


 


Aorta CTA 5/25/18 0000 Signed





Impressions: 





 CONCLUSION:





 1.  Negative CTA Thoraco Abdominal Aorta.





 2.  No evidence of aneurysm, intimal dissection or inflammatory vascular diseas





 e.





  





 








Laboratory Tests








Test


  5/24/18


18:18 5/24/18


23:55 5/25/18


10:48 5/25/18


13:42


 


Random Cortisol 28.2 MCG/DL   53.4 MCG/DL  


 


Urine Color  YELLOW   


 


Urine Turbidity  CLEAR   


 


Urine pH  7.5   


 


Urine Specific Gravity  1.010   


 


Urine Protein  TRACE mg/dL   


 


Urine Glucose (UA)  NEG mg/dL   


 


Urine Ketones  10 mg/dL   


 


Urine Occult Blood  NEG   


 


Urine Nitrite  NEG   


 


Urine Bilirubin  NEG   


 


Urine Urobilinogen


  


  LESS THAN 2.0


MG/DL 


  


 


 


Urine Leukocyte Esterase  NEG   


 


Urine RBC  7 /hpf   


 


Urine WBC  2 /hpf   


 


Urine Squamous Epithelial


Cells 


  <1 /hpf 


  


  


 


 


Urine Amorphous Sediment  RARE   


 


Urine Bacteria  RARE /hpf   


 


Urine Mucus  FEW /lpf   


 


Microscopic Urinalysis Comment


  


  CULT NOT


INDICATED 


  


 


 


Urine Opiates Screen  NEG   


 


Urine Barbiturates Screen  NEG   


 


Urine Amphetamines Screen  NEG   


 


Urine Benzodiazepines Screen  NEG   


 


Urine Cocaine Screen  NEG   


 


Urine Cannabinoids Screen  NEG   


 


White Blood Count   5.9 TH/MM3  


 


Red Blood Count   4.97 MIL/MM3  


 


Hemoglobin   14.2 GM/DL  


 


Hematocrit   42.1 %  


 


Mean Corpuscular Volume   84.6 FL  


 


Mean Corpuscular Hemoglobin   28.6 PG  


 


Mean Corpuscular Hemoglobin


Concent 


  


  33.8 % 


  


 


 


Red Cell Distribution Width   16.7 %  


 


Platelet Count   102 TH/MM3  


 


Mean Platelet Volume   9.1 FL  


 


Neutrophils (%) (Auto)   89.3 %  


 


Lymphocytes (%) (Auto)   3.7 %  


 


Monocytes (%) (Auto)   6.8 %  


 


Eosinophils (%) (Auto)   0.0 %  


 


Basophils (%) (Auto)   0.2 %  


 


Neutrophils # (Auto)   5.3 TH/MM3  


 


Lymphocytes # (Auto)   0.2 TH/MM3  


 


Monocytes # (Auto)   0.4 TH/MM3  


 


Eosinophils # (Auto)   0.0 TH/MM3  


 


Basophils # (Auto)   0.0 TH/MM3  


 


CBC Comment   AUTO DIFF  


 


Differential Comment


  


  


  AUTO DIFF


CONFIRMED 


 


 


Platelet Estimate   LOW  


 


Platelet Morphology Comment   NORMAL  


 


Tear Drop Cells   1+  


 


Ovalocytes   2+  


 


Blood Urea Nitrogen   6 MG/DL  


 


Creatinine   0.28 MG/DL  


 


Random Glucose   116 MG/DL  


 


Total Protein   7.2 GM/DL  


 


Albumin   2.2 GM/DL  


 


Calcium Level   7.4 MG/DL  


 


Alkaline Phosphatase   64 U/L  


 


Aspartate Amino Transf


(AST/SGOT) 


  


  29 U/L 


  


 


 


Alanine Aminotransferase


(ALT/SGPT) 


  


  40 U/L 


  


 


 


Total Bilirubin   0.8 MG/DL  


 


Sodium Level   123 MEQ/L  


 


Potassium Level   3.1 MEQ/L  


 


Chloride Level   90 MEQ/L  


 


Carbon Dioxide Level   19.5 MEQ/L  


 


Anion Gap   14 MEQ/L  


 


Estimat Glomerular Filtration


Rate 


  


  243 ML/MIN 


  


 


 


Protein Corrected Calcium   7.4 MG/DL  


 


Magnesium Level   1.6 MG/DL  


 


Erythrocyte Sedimentation Rate    12 mm/hr 


 


Test


  5/25/18


20:00 5/26/18


06:37 5/27/18


05:26 


 


 


Blood Urea Nitrogen 11 MG/DL  9 MG/DL  14 MG/DL  


 


Creatinine 0.43 MG/DL  0.29 MG/DL  0.47 MG/DL  


 


Random Glucose 139 MG/DL  101 MG/DL  101 MG/DL  


 


Total Protein 6.3 GM/DL  6.5 GM/DL  6.0 GM/DL  


 


Calcium Level 7.3 MG/DL  7.4 MG/DL  7.5 MG/DL  


 


Sodium Level 126 MEQ/L  129 MEQ/L  133 MEQ/L  


 


Potassium Level 3.8 MEQ/L  3.6 MEQ/L  3.8 MEQ/L  


 


Chloride Level 95 MEQ/L  98 MEQ/L  102 MEQ/L  


 


Carbon Dioxide Level 21.8 MEQ/L  21.4 MEQ/L  22.4 MEQ/L  


 


Anion Gap 9 MEQ/L  10 MEQ/L  9 MEQ/L  


 


Estimat Glomerular Filtration


Rate 148 ML/MIN 


  234 ML/MIN 


  134 ML/MIN 


  


 


 


Protein Corrected Calcium 7.7 MG/DL  7.7 MG/DL  8.1 MG/DL  


 


White Blood Count  5.4 TH/MM3   


 


Red Blood Count  4.57 MIL/MM3   


 


Hemoglobin  13.0 GM/DL   


 


Hematocrit  38.6 %   


 


Mean Corpuscular Volume  84.5 FL   


 


Mean Corpuscular Hemoglobin  28.5 PG   


 


Mean Corpuscular Hemoglobin


Concent 


  33.8 % 


  


  


 


 


Red Cell Distribution Width  16.8 %   


 


Platelet Count  106 TH/MM3   


 


Mean Platelet Volume  9.1 FL   


 


Neutrophils (%) (Auto)  86.5 %   


 


Lymphocytes (%) (Auto)  5.5 %   


 


Monocytes (%) (Auto)  7.8 %   


 


Eosinophils (%) (Auto)  0.1 %   


 


Basophils (%) (Auto)  0.1 %   


 


Neutrophils # (Auto)  4.7 TH/MM3   


 


Lymphocytes # (Auto)  0.3 TH/MM3   


 


Monocytes # (Auto)  0.4 TH/MM3   


 


Eosinophils # (Auto)  0.0 TH/MM3   


 


Basophils # (Auto)  0.0 TH/MM3   


 


CBC Comment  DIFF FINAL   


 


Differential Comment     


 


Magnesium Level  1.7 MG/DL  1.6 MG/DL  











Medical Decision Making


Impression and Plan


Impression:


1.  Mild subarachnoid hemorrhage-intraventricular hemorrhage noted on MRI.  

Likely sequela of hypertension, possible contribution of diabetes to underlying 

microvascular disease.  Cannot totally rule out amyloid angiopathy.


2.  History of lupus


3.  Diabetes





Patient is stable in her examination. 


Past 24 hrs: Afebrile. Intermittent hypertension. 





Reviewed labs for today. Sodium 133. 





MRI brain 2018/05/26 demonstrated minimal subarachnoid blood along the cortical 

surface of the occipital lobes w/trace blood in the ventricles. No acute infarct

, parenchymal haemorrhage, mass or edema. Cerebral white matter disease. 





Plan: 


Primary management per Hospitalist. 


Neuro checks. 


Stat CT brain for any decline in neuro status. 


Mobilise patient w/assistance. 


Physical Therapy eval & tx. 


Hold pharmacologic DVT prophylaxis. 


Mechanical DVT prophylaxis. 


Recommend CT angiogram of the head to more clearly delineate any significant 

vascular changes including signs of vasculitis.  


Recommend MRI cervical spine to assess for possible cord compression-myelopathy.











[facility] May 27, 2018 16:35

## 2018-05-27 NOTE — HHI.DS
__________________________________________________





Discharge Summary


Admission Date


May 25, 2018 at 15:12


Discharge Date:  May 27, 2018


Admitting Diagnosis





Hyponatremia, nausea and vomiting





(1) Intramedullary hemorrhage


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


(2) Hyponatremia


ICD Code:  E87.1 - Hypo-osmolality and hyponatremia


Status:  Acute


(3) Nausea and vomiting


ICD Code:  R11.2 - Nausea with vomiting, unspecified


Status:  Acute


Procedures


none


Brief History - From Admission


62-year-old female with a history of arthritis, lupus, diabetes, hypothyroidism

, hypertension presents to the ED for evaluation of generalized weakness.  

Patient was recently discharged yesterday after a hospital stay for chest pain 

and shoulder pain.  Patient was cleared from a cardiology standpoint and sent 

home.  According to family she has not been able to eat or keep anything down 

she has been nauseated and vomiting since she has been released from the 

hospital.  She is also complaining of 8/10 back pain, intermittent, throbbing, 

with associated nausea.  Worse with movement but has been better with pain 

medication.  Family also states patient has not had a bowel movement since 

Monday.  Family is concerned because patient has been declining healthwise 

since November and lack of appetite.  Upon examination patient is laying in bed 

dry heaving complaining of back pain and asking for pain medication.  Family is 

afraid to take her home again because they are concerned that she will fall due 

to her weakness.  She denies any associated chest pain, shortness of breath, 

fever or chills.


CBC/BMP:  


5/26/18 0637                                                                   

             5/27/18 0526





Significant Findings





Laboratory Tests








Test


  5/24/18


23:55 5/25/18


10:48 5/25/18


13:42 5/25/18


20:00


 


Urine Ketones 10 mg/dL (NEG)    


 


Urine RBC 7 /hpf (0-3)    


 


Urine Bacteria


  RARE /hpf


(NONE) 


  


  


 


 


Urine Mucus FEW /lpf (OCC)    


 


Platelet Count


  


  102 TH/MM3


(150-450) 


  


 


 


Neutrophils (%) (Auto)


  


  89.3 %


(16.0-70.0) 


  


 


 


Lymphocytes (%) (Auto)


  


  3.7 %


(9.0-44.0) 


  


 


 


Lymphocytes # (Auto)


  


  0.2 TH/MM3


(1.0-4.8) 


  


 


 


Platelet Estimate  LOW (NORMAL)   


 


Tear Drop Cells  1+ (NORMAL)   


 


Ovalocytes  2+ (NORMAL)   


 


Blood Urea Nitrogen  6 MG/DL (7-18)   


 


Creatinine


  


  0.28 MG/DL


(0.50-1.00) 


  0.43 MG/DL


(0.50-1.00)


 


Random Glucose


  


  116 MG/DL


() 


  139 MG/DL


()


 


Albumin


  


  2.2 GM/DL


(3.4-5.0) 


  


 


 


Calcium Level


  


  7.4 MG/DL


(8.5-10.1) 


  7.3 MG/DL


(8.5-10.1)


 


Sodium Level


  


  123 MEQ/L


(136-145) 


  126 MEQ/L


(136-145)


 


Potassium Level


  


  3.1 MEQ/L


(3.5-5.1) 


  


 


 


Chloride Level


  


  90 MEQ/L


() 


  95 MEQ/L


()


 


Carbon Dioxide Level


  


  19.5 MEQ/L


(21.0-32.0) 


  


 


 


Protein Corrected Calcium


  


  7.4 MG/DL


(8.5-10.1) 


  7.7 MG/DL


(8.5-10.1)


 


Total Protein


  


  


  


  6.3 GM/DL


(6.4-8.2)


 


Test


  5/26/18


06:37 5/27/18


05:26 


  


 


 


Platelet Count


  106 TH/MM3


(150-450) 


  


  


 


 


Neutrophils (%) (Auto)


  86.5 %


(16.0-70.0) 


  


  


 


 


Lymphocytes (%) (Auto)


  5.5 %


(9.0-44.0) 


  


  


 


 


Lymphocytes # (Auto)


  0.3 TH/MM3


(1.0-4.8) 


  


  


 


 


Creatinine


  0.29 MG/DL


(0.50-1.00) 0.47 MG/DL


(0.50-1.00) 


  


 


 


Calcium Level


  7.4 MG/DL


(8.5-10.1) 7.5 MG/DL


(8.5-10.1) 


  


 


 


Sodium Level


  129 MEQ/L


(136-145) 133 MEQ/L


(136-145) 


  


 


 


Protein Corrected Calcium


  7.7 MG/DL


(8.5-10.1) 8.1 MG/DL


(8.5-10.1) 


  


 


 


Total Protein


  


  6.0 GM/DL


(6.4-8.2) 


  


 


 


C-Reactive Protein


  


  0.90 MG/DL


(0.00-0.30) 


  


 








PE at Discharge


GENERAL: Well-nourished, well-developed patient.


SKIN: Warm and dry.


HEAD: Normocephalic.


EYES: No scleral icterus. No injection or drainage. 


NECK: Supple, trachea midline. No JVD or lymphadenopathy.


CARDIOVASCULAR: Regular rate and rhythm without murmurs, gallops, or rubs. 


RESPIRATORY: Breath sounds equal bilaterally. No accessory muscle use.


GASTROINTESTINAL: Abdomen soft, non-tender, nondistended. 


EXTREMITIES: No cyanosis, or edema. 


NEUROLOGICAL: Awake, alert, and oriented x 3.  Left hand and leg are 3 out of 5 

strength, compared to 5 out of 5 on right


Hospital Course


62F with h/o Lupus and Type 2 Diabetes presented to the ER  on 5/24 with 

generalized weakness and electrolyte imbalance following two days of nausea/

vomiting.  She was rehydrated and hyponatremia, hypocalcemia, etc. seemed to be 

improving.  When I met her on 5/26 she complained of left arm and leg weakness, 

and exam was consistent with this complaint, showing 3/5 strength in UE and LE, 

but 5/5 on right.  Stroke work up was initiated, but CT scan was negative.  MRI 

was completed later in the day and showed unexplained subarachnoid hemorrhage 

and trace collection of blood in the ventricles.  Since a stroke wasn't the 

cause of her left side weakness, an MRI of the C-spine was ordered, which 

revealed an intramedullary hemorrhage associated with edema of the c-spine and 

a signal focus at C6 level.  Neurology was consulted, and after reviewing the 

results concluded that a transfer to [facility] would be the best option 

for managing her condition.  I spoke with Dr. Keene (Neurologist) who is 

reviewing films and speaking with the stroke team to find out where the best 

placement would be for Mrs. Bustamante's condition.  Spinal hemorrhages are rare, 

and intermedullary spinal hemorrages are the rarest among that category.  Most 

common cause of this presentation is an AVM, but there have been cases 

described in the literature that were related to vasculitis caused by lupus 

flare up.  She is being covered with Solu-Cortef until vasculitis can be ruled 

out, or AVM can be ruled in.  Transfer to [facility] is currently underway and we 

are expecting a bed assignment soon.  Patient and her family are aware of the 

plan to transfer. Secondary but pertinent health conditions include elevated 

blood pressure and type 2 diabetes.


Pt Condition on Discharge:  Stable


Discharge Disposition:  Trnsfr to Other Facility


Discharge Time:  > 30 minutes


Discharge Instructions


DIET: Follow Instructions for:  As Tolerated, No Restrictions


Activities you can perform:  Weight Bearing as [place]











Naga Charles MD May 27, 2018 19:28

## 2018-05-27 NOTE — HHI.PR
Subjective


Remarks


Patient continues to have left-sided weakness.  CT of the brain and MRI were 

both negative yesterday.  Patient herself is suspicious of a disc herniation in 

her cervical spine since that is where she feels pain the most.





Objective


Vitals





Vital Signs








  Date Time  Temp Pulse Resp B/P (MAP) Pulse Ox O2 Delivery O2 Flow Rate FiO2


 


5/27/18 12:00 97.9 92 16 135/65 (88) 100   


 


5/27/18 08:00 98.5 97 16 156/79 (104) 96   


 


5/27/18 04:00 99.0 102 18 139/79 (99) 99   


 


5/27/18 00:00 98.7 90 16 182/92 (122) 99   


 


5/26/18 20:00 97.7 86 18 173/103 (126) 99   














I/O      


 


 5/26/18 5/26/18 5/26/18 5/27/18 5/27/18 5/27/18





 06:59 14:59 22:59 06:59 14:59 22:59


 


Intake Total 240 ml   240 ml  


 


Balance 240 ml   240 ml  


 


      


 


Intake Oral 240 ml   240 ml  


 


# Voids 2  4 3  


 


# Bowel Movements    1  








Result Diagram:  


5/26/18 0637                                                                   

             5/27/18 0526





Objective Remarks


GENERAL: Well-nourished, well-developed patient.


SKIN: Warm and dry.


HEAD: Normocephalic.


EYES: No scleral icterus. No injection or drainage. 


NECK: Supple, trachea midline. No JVD or lymphadenopathy.


CARDIOVASCULAR: Regular rate and rhythm without murmurs, gallops, or rubs. 


RESPIRATORY: Breath sounds equal bilaterally. No accessory muscle use.


GASTROINTESTINAL: Abdomen soft, non-tender, nondistended. 


EXTREMITIES: No cyanosis, or edema. 


NEUROLOGICAL: Awake, alert, and oriented x 3.  Left hand and leg are 3 out of 5 

strength, compared to 5 out of 5 on right


Procedures


none





A/P


Problem List:  


(1) Hyponatremia


ICD Code:  E87.1 - Hypo-osmolality and hyponatremia


Status:  Acute


(2) Nausea and vomiting


ICD Code:  R11.2 - Nausea with vomiting, unspecified


Status:  Acute


Assessment and Plan








62-year-old female with a history of arthritis, lupus, diabetes, hypothyroidism

, hypertension presents to the ED for evaluation of generalized weakness.





Left upper and lower extremity weakness


Both MRI and CT brain were negative for stroke


MRI of brain showed unexplained areas of tiny hemorrhage and subarachnoid and 

ventricular spaces


Blood pressure medications resumed


MRI of C-spine is pending


Appreciate neurosurgery consult


Neurology consulted to assist with evaluation of weakness





Hypocalcemia


Improved but not resolved


Repeat calcium gluconate 1 g IV


Follow-up ionized calcium level with a.m. labs





Hyponatremia


Nearly resolved





h/o Lupus 


If cervical spine MRI is within normal limits strongly consider lupus flareup 

affecting brain


ESR and CRP ordered 





Hypertension


Blood pressure medications resumed





DVT prophylaxis


Naga Quintero MD May 27, 2018 16:06

## 2018-05-27 NOTE — RADRPT
EXAM DATE:  5/27/2018 8:08 PM EDT

AGE/SEX:        62 years / Female



INDICATIONS:  Cerebrovascular accident.



CLINICAL DATA:  This is the patient's initial encounter. Patient reports that signs and symptoms have
 been present for 1 day and indicates a pain score of 0/10. 

                                                                          

MEDICAL/SURGICAL HISTORY:   Cardiovascular disease.  Hypertension.  Diabetes. Appendectomy.  Hysterec
suzette.



RADIATION DOSE:  9.57 CTDI (mGy)









COMPARISON:      C, CTA CAROTID ARTERIES W 3D RECON, 5/27/2018.  .



TECHNIQUE:  Volumetric scanning was performed using a multi-row detector CT scanner during bolus infu
jeancarlos of 74 ml Omnipaque 350 (iohexol)  nonionic water-soluble contrast as a single exam dose.   The d
maggie was post processed with a variety of visualization algorithms including full volume maximum inten
sity projection, multi-planar sliding thin slab reformation, curved planar reformation, and surface r
endering techniques.  Using automated exposure control and adjustment of the mA and/or kV according t
o patient size, radiation dose was kept as low as reasonably achievable to obtain optimal diagnostic 
quality images.



FINDINGS:  



Anterior circulation: The carotid siphons are patent but demonstrate some mild luminal irregularity. 
An anomalous vessel is identified extending from the cavernous segment of the left internal carotid a
rtery to the distal basilar artery. This is characteristic of a persistent trigeminal artery. The rig
ht posterior cerebral artery originates directly from the anterior circulation. The middle and anteri
or cerebral vessels are patent and demonstrate focal short segmental areas of narrowing. There is no 
significant occlusive disease or high-grade stenosis.. There is no evidence of focal aneurysm.



Posterior circulation: The intracranial segments of the vertebral arteries are very small. There is s
ignificant narrowing of the proximal basilar artery. The basilar artery returns to normal caliber dis
shamir to the anomalous vessel described in the anterior circulation.

Posterior cerebral arteries are otherwise patent. There is no evidence of focal aneurysm.



CONCLUSION:

1.  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

2.  Small distal vertebral arteries and proximal basilar artery

3.  Mild stenotic segments are identified in the cerebral vessels characteristic of vasculopathy.

4.  No evidence of high-grade stenosis, occlusion or aneurysm.

5.  No evidence of intracranial vascular malformation.



Electronically signed by: Marcelo Delaney MD  5/27/2018 8:25 PM EDT

## 2018-05-27 NOTE — RADRPT
EXAM DATE:  5/27/2018 3:37 PM EDT

AGE/SEX:        62 years / Female



INDICATIONS:    .  Severe neck pain.



CLINICAL DATA:  This is the patient's initial encounter. Patient reports that signs and symptoms have
 been present for 2 days and indicates a pain score of 3/10. 

                                                                          

MEDICAL/SURGICAL HISTORY:       Hypertension. . Bladder sling.



COMPARISON:      [facility] [place], MRI BRAIN W/O CONTRAST, 5/26/2018.  .



TECHNIQUE:  Multiplanar, multisequence MRI examination of the cervical spine was performed without an
d with 10cc  ml Omniscan (gadodiamide) contrast as a single exam dose.







FINDINGS:  

ALIGNMENT:  Vertebral bodies are satisfactorily aligned without evidence of listhesis.



FACET AND OSSEOUS STRUCTURES:  Vertebral body height is well-maintained. There is no evidence of acut
e fracture, bone marrow edema or destructive changes. There is no significant facet arthropathy. 



INTERVERTEBRAL DISC SPACES:  Intervertebral disc are well-maintained without evidence of significant 
degenerative change. There is no evidence of disc herniation.



NEUROLOGIC STRUCTURES: Diffuse signal abnormality is identified throughout the spinal cord extending 
from C2 through the upper thoracic spine (T2). There is widening of the spinal cord with predominantl
y T2 hyperintense changes. Gradient echo images demonstrates susceptibility within the spinal cord be
ginning at the top of C5 and extending to T1. This characteristic of focal hemorrhage within the cord
. In the left lateral epidural space extending from the C4 level to the upper thoracic spine there is
 crescent shape T1 hyperintensity which also includes a small enhancing nodule measuring approximatel
y 4 mm located at the C6 level. The epidural findings are characteristic of acute to subacute blood. 
Intradural space involvement cannot be excluded.



CONCLUSION: 

1.  Diffuse spinal cord enlargement with edema and evidence of intramedullary hemorrhage. This is ass
ociated with left lateral extra-axial hemorrhage and a small focus of enhancement at the C6 level. Di
fferential considerations would include hemorrhagic spinal cord edema associated with a vascular malf
ormation or trauma. Other forms of vasculopathy must also be considered.

2.  No evidence of acute bony abnormality

3.  No evidence of disc herniation.



Electronically signed by: Marcelo Delaney MD  5/27/2018 4:44 PM EDT

## 2018-05-28 VITALS
TEMPERATURE: 98.3 F | OXYGEN SATURATION: 100 % | RESPIRATION RATE: 18 BRPM | DIASTOLIC BLOOD PRESSURE: 87 MMHG | SYSTOLIC BLOOD PRESSURE: 167 MMHG | HEART RATE: 80 BPM

## 2018-05-28 VITALS
TEMPERATURE: 98.4 F | DIASTOLIC BLOOD PRESSURE: 75 MMHG | RESPIRATION RATE: 17 BRPM | OXYGEN SATURATION: 99 % | HEART RATE: 82 BPM | SYSTOLIC BLOOD PRESSURE: 163 MMHG

## 2018-05-28 VITALS
TEMPERATURE: 98.4 F | OXYGEN SATURATION: 99 % | SYSTOLIC BLOOD PRESSURE: 176 MMHG | DIASTOLIC BLOOD PRESSURE: 90 MMHG | RESPIRATION RATE: 17 BRPM | HEART RATE: 90 BPM

## 2018-05-28 LAB
ALB/GLOB RATIO (SPE): 0.89 (ref 1.39–2.23)
BUN SERPL-MCNC: 10 MG/DL (ref 7–18)
CALCIUM SERPL-MCNC: 7.9 MG/DL (ref 8.5–10.1)
CHLORIDE SERPL-SCNC: 99 MEQ/L (ref 98–107)
CREAT SERPL-MCNC: 0.3 MG/DL (ref 0.5–1)
GFR SERPLBLD BASED ON 1.73 SQ M-ARVRAT: 225 ML/MIN (ref 89–?)
GLUCOSE SERPL-MCNC: 131 MG/DL (ref 74–106)
HCO3 BLD-SCNC: 26.4 MEQ/L (ref 21–32)
SODIUM SERPL-SCNC: 132 MEQ/L (ref 136–145)

## 2018-05-28 RX ADMIN — ACETAMINOPHEN PRN MG: 325 TABLET ORAL at 13:45

## 2018-05-28 RX ADMIN — LEVOTHYROXINE SODIUM SCH MCG: 50 TABLET ORAL at 05:45

## 2018-05-28 RX ADMIN — METOPROLOL TARTRATE SCH MG: 25 TABLET, FILM COATED ORAL at 10:19

## 2018-05-28 RX ADMIN — PANTOPRAZOLE SODIUM SCH MG: 40 INJECTION, POWDER, FOR SOLUTION INTRAVENOUS at 10:22

## 2018-05-28 RX ADMIN — Medication SCH ML: at 10:24

## 2018-05-28 RX ADMIN — VITAMIN D, TAB 1000IU (100/BT) SCH UNITS: 25 TAB at 10:20

## 2018-05-28 RX ADMIN — HYDROCORTISONE SODIUM SUCCINATE SCH MG: 100 INJECTION, POWDER, FOR SOLUTION INTRAMUSCULAR; INTRAVENOUS at 05:44

## 2018-05-28 RX ADMIN — NYSTATIN AND TRIAMCINOLONE ACETONIDE SCH APPLIC: 100000; 1 OINTMENT TOPICAL at 10:23

## 2018-05-28 RX ADMIN — LOSARTAN POTASSIUM SCH MG: 50 TABLET, FILM COATED ORAL at 10:19

## 2018-05-28 RX ADMIN — HYDROCORTISONE SODIUM SUCCINATE SCH MG: 100 INJECTION, POWDER, FOR SOLUTION INTRAMUSCULAR; INTRAVENOUS at 13:42

## 2018-05-28 RX ADMIN — STANDARDIZED SENNA CONCENTRATE AND DOCUSATE SODIUM SCH TAB: 8.6; 5 TABLET, FILM COATED ORAL at 10:20

## 2018-05-28 NOTE — RADRPT
EXAM DATE:  5/27/2018 9:06 PM EDT

AGE/SEX:        62 years / Female



INDICATIONS:  Cerebrovascular accident.



CLINICAL DATA:  This is the patient's initial encounter. Patient reports that signs and symptoms have
 been present for 1 day and indicates a pain score of 0/10. 

                                                                          

MEDICAL/SURGICAL HISTORY:   Cardiovascular disease.  Hypertension.  Diabetes. Appendectomy.  Cholecys
tectomy.



RADIATION DOSE:  9.57 CTDI (mGy)









COMPARISON:      No prior [place] exams available for comparison.



TECHNIQUE:  Volumetric scanning was performed using a multirow detector CT scanner during bolus infus
ion of 74 ml Omnipaque 350 (iohexol)  nonionic water-soluble contrast as a cumulative dose for multip
le exams.   The data was postprocessed with a variety of visualization algorithms including full-volu
me maximum intensity projection, multiplanar sliding thin-slab reformation, curved-planar reformation
, and surface-rendering techniques.  Using automated exposure control and adjustment of the mA and/or
 kV according to patient size, radiation dose was kept as low as reasonably achievable to obtain opti
mal diagnostic quality images.



FINDINGS:  

Aortic Arch:  There is a three-vessel origin of the great vessels from the aorta.  No evidence of ost
ial narrowing

Right Carotid:  The common carotid artery is intact.  The carotid bulb has a normal configuration wit
hout ulceration or narrowing.  The internal carotid artery lumen is smooth without stenosis.  The ext
ernal carotid artery is intact.

Left Carotid:  The common carotid artery is intact.  The carotid bulb has a normal configuration with
out ulceration or narrowing.  The internal carotid artery lumen is smooth without stenosis.  The exte
rnal carotid artery is intact.

Vertebrals:  The vertebral arteries have a symmetric diameter.  No stenotic lesions are seen.



Elevated flow velocities and ICA/CCA ratios have been found to correlate with increased degrees of ve
ssel stenosis, calculated as percentage of diameter relative to a normal segment of distal ICA/CCA.



CONCLUSION:

1.  No carotid stenosis seen.



Electronically signed by: José Manuel Moser MD  5/28/2018 12:22 PM EDT

## 2018-05-28 NOTE — HHI.PR
Subjective


Remarks


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 

transport to [facility].  She has no worsening of her current condition

, symptoms include left hand, arm, leg weakness





Objective


Vitals





Vital Signs








  Date Time  Temp Pulse Resp B/P (MAP) Pulse Ox O2 Delivery O2 Flow Rate FiO2


 


5/28/18 11:30 98.4 90 17 176/90 (118) 99   


 


5/28/18 08:00 98.3 80 18 167/87 (113) 100   


 


5/28/18 04:00 98.4 82 17 163/75 (104) 99   


 


5/27/18 23:58 99.4 101 18 165/88 (113) 100   


 


5/27/18 19:43 98.0 98 18 190/99 (129) 99   


 


5/27/18 16:00 98.7 95 18 195/96 (129) 99   














I/O      


 


 5/27/18 5/27/18 5/27/18 5/28/18 5/28/18 5/28/18





 07:00 15:00 23:00 07:00 15:00 23:00


 


Intake Total 240 ml  480 ml 480 ml  


 


Balance 240 ml  480 ml 480 ml  


 


      


 


Intake Oral 240 ml  480 ml 480 ml  


 


# Voids 3  4 5  


 


# Bowel Movements 1   0  








Result Diagram:  


5/26/18 0637                                                                   

             5/28/18 0624





Objective Remarks


GENERAL: Well-nourished, well-developed patient.


SKIN: Warm and dry.


HEAD: Normocephalic.


EYES: No scleral icterus. No injection or drainage. 


NECK: Supple, trachea midline. No JVD or lymphadenopathy.


CARDIOVASCULAR: Regular rate and rhythm without murmurs, gallops, or rubs. 


RESPIRATORY: Breath sounds equal bilaterally. No accessory muscle use.


GASTROINTESTINAL: Abdomen soft, non-tender, nondistended. 


EXTREMITIES: No cyanosis, or edema. 


NEUROLOGICAL: Awake, alert, and oriented x 3.  Left hand and leg are 3 out of 5 

strength, compared to 5 out of 5 on right


Procedures


none





A/P


Problem List:  


(1) Intramedullary hemorrhage


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


(2) Hyponatremia


ICD Code:  E87.1 - Hypo-osmolality and hyponatremia


Status:  Acute


(3) Nausea and vomiting


ICD Code:  R11.2 - Nausea with vomiting, unspecified


Status:  Acute


Assessment and Plan








62-year-old female with a history of arthritis, lupus, diabetes, hypothyroidism

, hypertension presents to the ED for evaluation of generalized weakness.





Intramedullary hemorrhage of cervical spine


Marked swelling/edema in cervical spine with signal focus at C6 level


Suspicious for AVM which is also most common cause of this rare condition


This condition is beyond the scope of care that [place] can provide, therefore 

transferred to [facility] was arranged yesterday evening


Patient remains in our hospital this morning awaiting ambulance transport





Left upper and lower extremity weakness


Both MRI and CT brain were negative for stroke


MRI of brain showed unexplained areas of tiny hemorrhage and subarachnoid and 

ventricular spaces


MRI of C-spine diagnosed intramedullary hemorrhage with focus at C6


Neurology recommended transfer to [facility]





Hypocalcemia


Improved but not resolved


Repeat calcium gluconate 1 g IV


Follow-up ionized calcium level with a.m. labs





Hyponatremia


Nearly resolved





h/o Lupus 


Cervical spine was within normal limits of lupus-induced vasculitis is on the 

list of things that could cause an intramedullary hemorrhage of the spine


CRP was slightly elevated, patient was started on Solu-Cortef yesterday evening





Hypertension


Vascular neurologist recommends maintaining blood pressure between 160 and 180 

systolic





DVT prophylaxis


SCDs





Discharge planning


Patient is being transferred to [facility] today











Naga Charles MD May 28, 2018 14:53

## 2018-05-28 NOTE — HHI.PR
Subjective


Remarks


feels better ha better after steriods





Objective





Vital Signs








  Date Time  Temp Pulse Resp B/P (MAP) Pulse Ox O2 Delivery O2 Flow Rate FiO2


 


5/28/18 11:30 98.4 90 17 176/90 (118) 99   


 


5/28/18 08:00 98.3 80 18 167/87 (113) 100   


 


5/28/18 04:00 98.4 82 17 163/75 (104) 99   


 


5/27/18 23:58 99.4 101 18 165/88 (113) 100   


 


5/27/18 19:43 98.0 98 18 190/99 (129) 99   


 


5/27/18 16:00 98.7 95 18 195/96 (129) 99   














I/O      


 


 5/27/18 5/27/18 5/27/18 5/28/18 5/28/18 5/28/18





 07:00 15:00 23:00 07:00 15:00 23:00


 


Intake Total 240 ml  480 ml 480 ml  


 


Balance 240 ml  480 ml 480 ml  


 


      


 


Intake Oral 240 ml  480 ml 480 ml  


 


# Voids 3  4 5  


 


# Bowel Movements 1   0  








Result Diagram:  


5/26/18 0637                                                                   

             5/28/18 0624





Objective Remarks


left ptosis mild pupil= small


still weak distal lue





can wiggle left foot better but still weak lle mostly 1-2/5





Assessment and Plan


Assessment and Plan


imp





for [facility] for suspected cord avm





basilar small likely congenital may have some stenosis should look at at [facility] 

with angelica also











Jaren Smith MD May 28, 2018 12:52

## 2018-05-30 LAB — APTT PPP: 271 S (ref 42–200)

## 2018-05-31 LAB
APTT PPP: 258 S (ref 50–180)
APTT PPP: 277 % (ref 50–217)
Lab: 23 SEC (ref 22–34)
THROMBIN TIME: 16 SEC (ref 13–19)

## 2019-08-06 NOTE — RADRPT
EXAM DATE:  5/25/2018 4:02 PM EDT

AGE/SEX:        62 years / Female



INDICATIONS:  Aneurysm, back pain.



CLINICAL DATA:  This is the patient's initial encounter. Patient reports that signs and symptoms have
 been present for 2 weeks and indicates a pain score of 9/10. 

                                                                          

MEDICAL/SURGICAL HISTORY:   Cardiovascular disease.  Hypertension.  Lupus.  Diabetes.  Appendectomy. 
 Hysterectomy.



RADIATION DOSE:  12.48 CTDI (mGy)









COMPARISON:      No prior [place] exams available for comparison.



TECHNIQUE:  Volumetric scanning was performed using a multi-row detector CT scanner during bolus infu
jeancarlos of 90 ml Omnipaque 350 (iohexol)  nonionic water-soluble contrast as a single exam dose.  The da
ta was post processed with a variety of visualization algorithms including full volume maximum intens
ity projection, multi-planar sliding thin slab reformation, curved planar reformation, and surface re
ndering techniques.  Using automated exposure control and adjustment of the mA and/or kV according to
 patient size, radiation dose was kept as low as reasonably achievable to obtain optimal diagnostic q
uality images.



FINDINGS:

Lungs:  There is no consolidation or pneumothorax.  No concerning pulmonary nodule is visualized.  No
 pleural fluid is present.

Mediastinum:  No abnormally enlarged lymph nodes by CT criteria. No axillary or hilar abnormalities a
re identified. 

Abdomen:  The liver and spleen are free of focal defects. The gallbladder and pancreas demonstrate no
 abnormality. The adrenal glands are normal. The kidneys demonstrate no evidence of solid renal mass 
or hydronephrosis. No free fluid or abdominal masses are identified. No para-aortic adenopathy is see
n.

Pelvis:  No evidence of free fluid or pelvic mass. No abnormally enlarged inguinal or retroperitoneal
 lymph nodes are present. The bladder is unremarkable.

Thoracic Aorta:  The thoracic aortic root is normal with normal branching of the great vessels.  Ther
e is no evidence of aneurysm or dissection. 

Abdominal Aorta:  The aorta is normal in caliber without aneurysm or dissection.  The renal arteries 
are patent bilaterally.  The proximal celiac and superior mesenteric arteries are patent and normal i
n diameter.   

Pelvic Vessels:  The internal iliac and external iliac vessels are patent without aneurysm or stenosi
s.



CONCLUSION:

1.  Negative CTA Thoraco Abdominal Aorta.

2.  No evidence of aneurysm, intimal dissection or inflammatory vascular disease.



Electronically signed by: Marcelo Delaney MD  5/25/2018 4:16 PM EDT [facility]    The note from the fellow/resident was reviewed. I have personally interviewed and examined the patient. There were no additional findings with regards to the history or physical exam.    I agree with the assessment and plan of Dr. Kim.